# Patient Record
Sex: FEMALE
[De-identification: names, ages, dates, MRNs, and addresses within clinical notes are randomized per-mention and may not be internally consistent; named-entity substitution may affect disease eponyms.]

---

## 2017-05-11 ENCOUNTER — APPOINTMENT (OUTPATIENT)
Dept: DERMATOLOGY | Facility: CLINIC | Age: 37
End: 2017-05-11

## 2017-05-11 VITALS
BODY MASS INDEX: 18.04 KG/M2 | SYSTOLIC BLOOD PRESSURE: 110 MMHG | DIASTOLIC BLOOD PRESSURE: 76 MMHG | HEIGHT: 68 IN | WEIGHT: 119 LBS

## 2017-05-11 DIAGNOSIS — L82.1 OTHER SEBORRHEIC KERATOSIS: ICD-10-CM

## 2017-05-11 DIAGNOSIS — L70.9 ACNE, UNSPECIFIED: ICD-10-CM

## 2017-08-02 ENCOUNTER — APPOINTMENT (OUTPATIENT)
Dept: ORTHOPEDIC SURGERY | Facility: CLINIC | Age: 37
End: 2017-08-02

## 2017-08-03 ENCOUNTER — APPOINTMENT (OUTPATIENT)
Dept: DERMATOLOGY | Facility: CLINIC | Age: 37
End: 2017-08-03

## 2017-11-06 ENCOUNTER — APPOINTMENT (OUTPATIENT)
Dept: PULMONOLOGY | Facility: CLINIC | Age: 37
End: 2017-11-06

## 2017-11-06 RX ORDER — PREDNISONE 20 MG/1
20 TABLET ORAL
Qty: 8 | Refills: 0 | Status: DISCONTINUED | COMMUNITY
Start: 2017-10-12 | End: 2017-11-06

## 2017-11-06 RX ORDER — DOXYCYCLINE HYCLATE 100 MG/1
100 TABLET ORAL
Qty: 14 | Refills: 0 | Status: DISCONTINUED | COMMUNITY
Start: 2017-10-12 | End: 2017-11-06

## 2017-11-26 ENCOUNTER — TRANSCRIPTION ENCOUNTER (OUTPATIENT)
Age: 37
End: 2017-11-26

## 2018-01-26 ENCOUNTER — TRANSCRIPTION ENCOUNTER (OUTPATIENT)
Age: 38
End: 2018-01-26

## 2018-02-05 ENCOUNTER — TRANSCRIPTION ENCOUNTER (OUTPATIENT)
Age: 38
End: 2018-02-05

## 2018-02-14 ENCOUNTER — APPOINTMENT (OUTPATIENT)
Dept: PULMONOLOGY | Facility: CLINIC | Age: 38
End: 2018-02-14

## 2018-03-20 ENCOUNTER — APPOINTMENT (OUTPATIENT)
Dept: PULMONOLOGY | Facility: CLINIC | Age: 38
End: 2018-03-20

## 2018-03-20 RX ORDER — DOXYCYCLINE 100 MG/1
100 CAPSULE ORAL
Qty: 20 | Refills: 0 | Status: DISCONTINUED | COMMUNITY
Start: 2018-02-04

## 2018-03-20 RX ORDER — AMOXICILLIN AND CLAVULANATE POTASSIUM 875; 125 MG/1; MG/1
875-125 TABLET, COATED ORAL
Qty: 20 | Refills: 0 | Status: DISCONTINUED | COMMUNITY
Start: 2018-01-22

## 2018-03-20 RX ORDER — BENZONATATE 100 MG/1
100 CAPSULE ORAL
Qty: 21 | Refills: 0 | Status: DISCONTINUED | COMMUNITY
Start: 2018-01-26

## 2018-03-20 RX ORDER — ECONAZOLE NITRATE 10 MG/G
1 CREAM TOPICAL
Qty: 85 | Refills: 0 | Status: DISCONTINUED | COMMUNITY
Start: 2017-11-15

## 2018-03-22 ENCOUNTER — APPOINTMENT (OUTPATIENT)
Dept: DERMATOLOGY | Facility: CLINIC | Age: 38
End: 2018-03-22

## 2018-05-31 ENCOUNTER — APPOINTMENT (OUTPATIENT)
Dept: DERMATOLOGY | Facility: CLINIC | Age: 38
End: 2018-05-31

## 2018-10-03 ENCOUNTER — TRANSCRIPTION ENCOUNTER (OUTPATIENT)
Age: 38
End: 2018-10-03

## 2018-11-16 ENCOUNTER — APPOINTMENT (OUTPATIENT)
Dept: DERMATOLOGY | Facility: CLINIC | Age: 38
End: 2018-11-16

## 2019-01-15 ENCOUNTER — TRANSCRIPTION ENCOUNTER (OUTPATIENT)
Age: 39
End: 2019-01-15

## 2019-03-25 ENCOUNTER — TRANSCRIPTION ENCOUNTER (OUTPATIENT)
Age: 39
End: 2019-03-25

## 2019-04-26 ENCOUNTER — TRANSCRIPTION ENCOUNTER (OUTPATIENT)
Age: 39
End: 2019-04-26

## 2019-05-26 ENCOUNTER — TRANSCRIPTION ENCOUNTER (OUTPATIENT)
Age: 39
End: 2019-05-26

## 2019-05-28 ENCOUNTER — TRANSCRIPTION ENCOUNTER (OUTPATIENT)
Age: 39
End: 2019-05-28

## 2019-05-28 ENCOUNTER — APPOINTMENT (OUTPATIENT)
Dept: FAMILY MEDICINE | Facility: CLINIC | Age: 39
End: 2019-05-28
Payer: MEDICAID

## 2019-05-28 VITALS
RESPIRATION RATE: 16 BRPM | WEIGHT: 128 LBS | OXYGEN SATURATION: 99 % | DIASTOLIC BLOOD PRESSURE: 78 MMHG | HEART RATE: 74 BPM | TEMPERATURE: 97.3 F | HEIGHT: 68 IN | SYSTOLIC BLOOD PRESSURE: 124 MMHG | BODY MASS INDEX: 19.4 KG/M2

## 2019-05-28 DIAGNOSIS — Z80.0 FAMILY HISTORY OF MALIGNANT NEOPLASM OF DIGESTIVE ORGANS: ICD-10-CM

## 2019-05-28 DIAGNOSIS — N84.1 POLYP OF CERVIX UTERI: ICD-10-CM

## 2019-05-28 DIAGNOSIS — Z76.89 PERSONS ENCOUNTERING HEALTH SERVICES IN OTHER SPECIFIED CIRCUMSTANCES: ICD-10-CM

## 2019-05-28 DIAGNOSIS — I34.1 NONRHEUMATIC MITRAL (VALVE) PROLAPSE: ICD-10-CM

## 2019-05-28 DIAGNOSIS — Z12.83 ENCOUNTER FOR SCREENING FOR MALIGNANT NEOPLASM OF SKIN: ICD-10-CM

## 2019-05-28 DIAGNOSIS — N20.0 CALCULUS OF KIDNEY: ICD-10-CM

## 2019-05-28 LAB — CYTOLOGY CVX/VAG DOC THIN PREP: NORMAL

## 2019-05-28 PROCEDURE — 99204 OFFICE O/P NEW MOD 45 MIN: CPT | Mod: 25

## 2019-05-28 PROCEDURE — G0444 DEPRESSION SCREEN ANNUAL: CPT

## 2019-05-28 PROCEDURE — 36415 COLL VENOUS BLD VENIPUNCTURE: CPT

## 2019-05-28 RX ORDER — FLUTICASONE PROPIONATE 50 UG/1
50 SPRAY, METERED NASAL
Qty: 16 | Refills: 0 | Status: DISCONTINUED | COMMUNITY
Start: 2016-10-14 | End: 2019-05-28

## 2019-05-28 RX ORDER — NORGESTIMATE AND ETHINYL ESTRADIOL 7DAYSX3 LO
0.18/0.215/0.25 KIT ORAL
Qty: 28 | Refills: 0 | Status: DISCONTINUED | COMMUNITY
Start: 2017-03-10 | End: 2019-05-28

## 2019-05-28 RX ORDER — MELOXICAM 15 MG/1
15 TABLET ORAL
Qty: 30 | Refills: 0 | Status: DISCONTINUED | COMMUNITY
Start: 2017-07-18 | End: 2019-05-28

## 2019-05-28 RX ORDER — NORETHINDRONE ACETATE/ETHINYL ESTRADIOL AND FERROUS FUMARATE 1MG-20(21)
1-20 KIT ORAL
Qty: 28 | Refills: 0 | Status: DISCONTINUED | COMMUNITY
Start: 2017-09-28 | End: 2019-05-28

## 2019-05-28 RX ORDER — GUAIFENESIN AND PSEUDOEPHEDRINE HYDROCHLORIDE 600; 60 MG/1; MG/1
60-600 TABLET, EXTENDED RELEASE ORAL
Qty: 14 | Refills: 0 | Status: DISCONTINUED | COMMUNITY
Start: 2018-02-04 | End: 2019-05-28

## 2019-05-28 RX ORDER — MONTELUKAST SODIUM 10 MG/1
10 TABLET, FILM COATED ORAL
Refills: 0 | Status: COMPLETED | COMMUNITY
End: 2019-05-28

## 2019-05-28 NOTE — ASSESSMENT
[FreeTextEntry1] : Establish care\par - check labs today\par - up to date with pap, has follow up appt next month\par - will discuss vaccines at next visit\par - depression screening negative - PHQ score 0\par \par Joint pains\par - non specific joints\par - check labs, autoimmune labs\par \par Reflux/ Barretts / Hiatal hernia\par - recently diagnosed with Barretts on endoscopy\par - has seen multiple GI doctors for opinions\par - currently she just had a barium swallow and will be going for manometry\par - she is on omeprazole and zantac \par - she believes she is having negative side effects from omeprazole\par - advised she discuss with her GI and consider switching PPI to either pantoprazole or lansoprazole\par \par Skin cancer screening\par - due for dermatology check\par - referral given

## 2019-05-28 NOTE — PHYSICAL EXAM
[Well Nourished] : well nourished [Well Developed] : well developed [No Acute Distress] : no acute distress [Normal Sclera/Conjunctiva] : normal sclera/conjunctiva [Well-Appearing] : well-appearing [PERRL] : pupils equal round and reactive to light [EOMI] : extraocular movements intact [Normal Outer Ear/Nose] : the outer ears and nose were normal in appearance [Normal Oropharynx] : the oropharynx was normal [Normal TMs] : both tympanic membranes were normal [Supple] : supple [No Lymphadenopathy] : no lymphadenopathy [Thyroid Normal, No Nodules] : the thyroid was normal and there were no nodules present [Clear to Auscultation] : lungs were clear to auscultation bilaterally [No Respiratory Distress] : no respiratory distress  [Regular Rhythm] : with a regular rhythm [Normal Rate] : normal rate  [No Accessory Muscle Use] : no accessory muscle use [No Murmur] : no murmur heard [Normal S1, S2] : normal S1 and S2 [Pedal Pulses Present] : the pedal pulses are present [No Edema] : there was no peripheral edema [Soft] : abdomen soft [No Masses] : no abdominal mass palpated [Non-distended] : non-distended [Normal Posterior Cervical Nodes] : no posterior cervical lymphadenopathy [Normal Bowel Sounds] : normal bowel sounds [No HSM] : no HSM [Normal Anterior Cervical Nodes] : no anterior cervical lymphadenopathy [No CVA Tenderness] : no CVA  tenderness [No Spinal Tenderness] : no spinal tenderness [Grossly Normal Strength/Tone] : grossly normal strength/tone [No Joint Swelling] : no joint swelling [No Rash] : no rash [Normal Gait] : normal gait [No Focal Deficits] : no focal deficits [Alert and Oriented x3] : oriented to person, place, and time [Normal Insight/Judgement] : insight and judgment were intact [Normal Affect] : the affect was normal [de-identified] : mild epigastric tenderness to palpation

## 2019-05-28 NOTE — REVIEW OF SYSTEMS
[Heartburn] : heartburn [Joint Pain] : joint pain [Muscle Pain] : muscle pain [Joint Stiffness] : joint stiffness [Negative] : Psychiatric [Abdominal Pain] : no abdominal pain [Vomiting] : no vomiting [de-identified] : lightheaded

## 2019-05-28 NOTE — HISTORY OF PRESENT ILLNESS
[FreeTextEntry1] : establish care [de-identified] : Patient has multiple medical concerns. \par She was diagnosed with mitral valve prolapse at age 26. \par She had a miscarriage in 2007, 2011.  She has two children. \par Nasal polyps were removed in 2007, 2017.  She follows with Dr. Perlman for ENT.  \par Last year she had tightness in her chest/ shortness of breath.  She had pulmonary function tests done showing that she has asthma.  She uses Breo for this.  \par She gets blood work done every three months for elevated cholesterol.  She does not take medication for this at this time, diet controlled. \par Last blood work was in March. \par January 2019, she was not feeling well, feeling lightheaded.  She was having a pain in her upper left chest and back.  She went to urgent care at that time and her EKG was abnormal so she went to the hospital at [facility].  She saw a cardiologist and had testing done.  There were no blockages.  \par She saw a gastrointestinal doctor, Dr. Yee.  She had an endoscopy done and was told she has Barretts espophagus, started on omeprazole.  She followed with another GI, Dr. Patel, who wants her to have endoscopy done yearly.  She has not been feeling well since taking the omeprazole. She was not happy with this gastro, and now is seeing Dr. Anderson.  She had a barium swallow done with this GI doctor.  She is now planning to have manometry done.  He recommended to take omeprazole in morning and evening.  SHe was not tolerating twice daily so she is now only taking it once per day.\par She has also been having multiple joint pains which seems to be increasing since starting PPI.  She has been having joint pain for about one year.  It will last for a few days at a time, and then goes away on its own.  She notices her hands often feel hot/ burning sensation.  It happens more often at night but can happen during the day.  She also gets red and itching in her face when she gets the joint pain.  She also gets a tremor in her hands which has been going on for many years, as long as she can remember. \par

## 2019-05-28 NOTE — COUNSELING
[Healthy eating counseling provided] : healthy eating [Activity counseling provided] : activity [ - Annual Depression Screening] : Annual Depression Screening [Good understanding] : Patient has a good understanding of disease, goals and obesity follow-up plan [de-identified] : diet - healthy\par exercise - nothing currently

## 2019-05-28 NOTE — HEALTH RISK ASSESSMENT
[Good] : ~his/her~  mood as  good [No falls in past year] : Patient reported no falls in the past year [0] : 2) Feeling down, depressed, or hopeless: Not at all (0) [Patient reported PAP Smear was normal] : Patient reported PAP Smear was normal [HIV Test offered] : HIV Test offered [None] : None [With Family] : lives with family [Employed] : employed [Significant Other] : lives with significant other [# Of Children ___] : has [unfilled] children [Sexually Active] : sexually active [Fully functional (bathing, dressing, toileting, transferring, walking, feeding)] : Fully functional (bathing, dressing, toileting, transferring, walking, feeding) [Fully functional (using the telephone, shopping, preparing meals, housekeeping, doing laundry, using] : Fully functional and needs no help or supervision to perform IADLs (using the telephone, shopping, preparing meals, housekeeping, doing laundry, using transportation, managing medications and managing finances) [Smoke Detector] : smoke detector [Carbon Monoxide Detector] : carbon monoxide detector [Seat Belt] :  uses seat belt [Name: ___] : Health Care Proxy's Name: [unfilled]  [With Patient/Caregiver] : With Patient/Caregiver [Relationship: ___] : Relationship: [unfilled] [] : No [[id number]] : 0 [Change in mental status noted] : No change in mental status noted [Language] : denies difficulty with language [Reasoning] : denies difficulty with reasoning [Behavior] : denies difficulty with behavior [Reports changes in vision] : Reports no changes in vision [High Risk Behavior] : no high risk behavior [Reports changes in hearing] : Reports no changes in hearing [Reports changes in dental health] : Reports no changes in dental health [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

## 2019-05-31 LAB
25(OH)D3 SERPL-MCNC: 14.7 NG/ML
ALBUMIN SERPL ELPH-MCNC: 4.5 G/DL
ALP BLD-CCNC: 74 U/L
ALT SERPL-CCNC: 16 U/L
ANA SER IF-ACNC: NEGATIVE
ANION GAP SERPL CALC-SCNC: 16 MMOL/L
AST SERPL-CCNC: 18 U/L
BASOPHILS # BLD AUTO: 0.08 K/UL
BASOPHILS NFR BLD AUTO: 1.3 %
BILIRUB SERPL-MCNC: 0.3 MG/DL
BUN SERPL-MCNC: 10 MG/DL
CALCIUM SERPL-MCNC: 9.7 MG/DL
CCP AB SER IA-ACNC: <8 UNITS
CHLORIDE SERPL-SCNC: 101 MMOL/L
CHOLEST SERPL-MCNC: 172 MG/DL
CHOLEST/HDLC SERPL: 4 RATIO
CO2 SERPL-SCNC: 25 MMOL/L
CREAT SERPL-MCNC: 0.93 MG/DL
DSDNA AB SER-ACNC: <12 IU/ML
ENA SCL70 IGG SER IA-ACNC: <0.2 AL
ENA SS-A AB SER IA-ACNC: <0.2 AL
ENA SS-B AB SER IA-ACNC: <0.2 AL
EOSINOPHIL # BLD AUTO: 0.42 K/UL
EOSINOPHIL NFR BLD AUTO: 7 %
ESTIMATED AVERAGE GLUCOSE: 111 MG/DL
FOLATE SERPL-MCNC: 16.6 NG/ML
GLUCOSE SERPL-MCNC: 68 MG/DL
HBA1C MFR BLD HPLC: 5.5 %
HCT VFR BLD CALC: 45.1 %
HCV AB SER QL: NONREACTIVE
HCV S/CO RATIO: 0.16 S/CO
HDLC SERPL-MCNC: 43 MG/DL
HGB BLD-MCNC: 14.3 G/DL
HIV1+2 AB SPEC QL IA.RAPID: NONREACTIVE
IMM GRANULOCYTES NFR BLD AUTO: 0.2 %
LDLC SERPL CALC-MCNC: 105 MG/DL
LYMPHOCYTES # BLD AUTO: 1.79 K/UL
LYMPHOCYTES NFR BLD AUTO: 29.6 %
MAN DIFF?: NORMAL
MCHC RBC-ENTMCNC: 31 PG
MCHC RBC-ENTMCNC: 31.7 GM/DL
MCV RBC AUTO: 97.6 FL
MONOCYTES # BLD AUTO: 0.6 K/UL
MONOCYTES NFR BLD AUTO: 9.9 %
NEUTROPHILS # BLD AUTO: 3.14 K/UL
NEUTROPHILS NFR BLD AUTO: 52 %
PLATELET # BLD AUTO: 330 K/UL
POTASSIUM SERPL-SCNC: 4.1 MMOL/L
PROT SERPL-MCNC: 7.3 G/DL
RBC # BLD: 4.62 M/UL
RBC # FLD: 14.3 %
RF+CCP IGG SER-IMP: NEGATIVE
RHEUMATOID FACT SER QL: <10 IU/ML
SODIUM SERPL-SCNC: 142 MMOL/L
T4 FREE SERPL-MCNC: 1.3 NG/DL
TRIGL SERPL-MCNC: 118 MG/DL
TSH SERPL-ACNC: 2.37 UIU/ML
VIT B12 SERPL-MCNC: 332 PG/ML
WBC # FLD AUTO: 6.04 K/UL

## 2019-06-19 ENCOUNTER — APPOINTMENT (OUTPATIENT)
Dept: DERMATOLOGY | Facility: CLINIC | Age: 39
End: 2019-06-19

## 2019-07-02 ENCOUNTER — APPOINTMENT (OUTPATIENT)
Dept: DERMATOLOGY | Facility: CLINIC | Age: 39
End: 2019-07-02
Payer: MEDICAID

## 2019-07-02 VITALS — BODY MASS INDEX: 18.94 KG/M2 | HEIGHT: 68 IN | WEIGHT: 125 LBS

## 2019-07-02 VITALS — BODY MASS INDEX: 18.94 KG/M2 | WEIGHT: 125 LBS | HEIGHT: 68 IN

## 2019-07-02 DIAGNOSIS — L82.1 OTHER SEBORRHEIC KERATOSIS: ICD-10-CM

## 2019-07-02 DIAGNOSIS — L50.3 DERMATOGRAPHIC URTICARIA: ICD-10-CM

## 2019-07-02 PROCEDURE — 99213 OFFICE O/P EST LOW 20 MIN: CPT

## 2019-07-02 NOTE — CONSULT LETTER
[Dear  ___] : Dear  [unfilled], [Consult Letter:] : I had the pleasure of evaluating your patient, [unfilled]. [Consult Closing:] : Thank you very much for allowing me to participate in the care of this patient.  If you have any questions, please do not hesitate to contact me. [Sincerely,] : Sincerely, [FreeTextEntry2] : Emily Burgos, DO [FreeTextEntry1] : Examination of her skin reveals multiple small seborrheic keratoses on the trunk and no suspicious lesions.\par \par Patient also has scattered itching which may be due to dermatographism.\par \par Please see attached chart note for further details and treatment plan. [FreeTextEntry3] : Nick Sam MD\par [address], [address]\par [place] [address]\par Tel ([phone])\par Fax ([phone])\par Private line ([phone])\par

## 2019-07-02 NOTE — PHYSICAL EXAM
[Alert] : alert [Oriented x 3] : ~L oriented x 3 [Well Nourished] : well nourished [FreeTextEntry3] : The following areas were examined and no significant abnormalities were seen except as noted below:\par \par Type II skin\par \par scalp, face, eyelids, nose, lips, ears, neck, chest, abdomen, back, buttocks, right arm, left arm, right hand, left hand,\par right  leg, left leg, right foot, left foot\par Breast and groin exams offered and declined by patient.\par \par Face: Mild ill-defined scaling present in the glabella and cheeks\par Moderate comedones present on cheeks\par Left lower neck:2 2-3mm brown verrucous papules\par Trunk: Multiple small few large brown verrucous papule/plaques\par Popliteal fossa: No rash seen\par Mild follicular scaling noted\par \par No suspicious lesions seen\par \par stroke test:\par \par 2+ flare\par 2+ wheal\par ? pruritus

## 2019-07-02 NOTE — HISTORY OF PRESENT ILLNESS
[FreeTextEntry1] : Itching of face [de-identified] : Followup visit for 39-year-old referred by Eimly Burgos DO with a two-month history of marked itching of the face. Patient also complains of itching on the left lower neck area around 2 moles.  Patient also complains of itching behind the knees. No previous treatment. No previous episodes.\par Patient also presents for evaluation of growths. No history of skin cancer.

## 2019-07-02 NOTE — ASSESSMENT
[FreeTextEntry1] : ? Dermatographism as the cause of her itching\par Multiple seborrheic keratoses present on trunk

## 2019-07-15 ENCOUNTER — APPOINTMENT (OUTPATIENT)
Dept: DERMATOLOGY | Facility: CLINIC | Age: 39
End: 2019-07-15

## 2019-07-22 ENCOUNTER — APPOINTMENT (OUTPATIENT)
Dept: FAMILY MEDICINE | Facility: CLINIC | Age: 39
End: 2019-07-22
Payer: MEDICAID

## 2019-07-22 VITALS
RESPIRATION RATE: 14 BRPM | WEIGHT: 126 LBS | HEIGHT: 68 IN | HEART RATE: 63 BPM | DIASTOLIC BLOOD PRESSURE: 70 MMHG | SYSTOLIC BLOOD PRESSURE: 100 MMHG | OXYGEN SATURATION: 99 % | BODY MASS INDEX: 19.1 KG/M2

## 2019-07-22 PROCEDURE — 99214 OFFICE O/P EST MOD 30 MIN: CPT

## 2019-07-22 RX ORDER — RANITIDINE HYDROCHLORIDE 150 MG/1
150 TABLET, FILM COATED ORAL
Refills: 0 | Status: COMPLETED | COMMUNITY
End: 2019-07-22

## 2019-07-22 NOTE — PHYSICAL EXAM
[No Acute Distress] : no acute distress [Well Nourished] : well nourished [Well Developed] : well developed [Well-Appearing] : well-appearing [Normal Sclera/Conjunctiva] : normal sclera/conjunctiva [PERRL] : pupils equal round and reactive to light [EOMI] : extraocular movements intact [Normal Outer Ear/Nose] : the outer ears and nose were normal in appearance [Normal Oropharynx] : the oropharynx was normal [Normal TMs] : both tympanic membranes were normal [No Lymphadenopathy] : no lymphadenopathy [Supple] : supple [No Respiratory Distress] : no respiratory distress  [No Accessory Muscle Use] : no accessory muscle use [Clear to Auscultation] : lungs were clear to auscultation bilaterally [Normal Rate] : normal rate  [Regular Rhythm] : with a regular rhythm [Normal S1, S2] : normal S1 and S2 [No Edema] : there was no peripheral edema [Soft] : abdomen soft [Non Tender] : non-tender [Non-distended] : non-distended [Normal Bowel Sounds] : normal bowel sounds [Normal Posterior Cervical Nodes] : no posterior cervical lymphadenopathy [Normal Anterior Cervical Nodes] : no anterior cervical lymphadenopathy [No Spinal Tenderness] : no spinal tenderness [Grossly Normal Strength/Tone] : grossly normal strength/tone [No Rash] : no rash [Coordination Grossly Intact] : coordination grossly intact [No Focal Deficits] : no focal deficits [Normal Gait] : normal gait [Normal Affect] : the affect was normal [Alert and Oriented x3] : oriented to person, place, and time [Normal Insight/Judgement] : insight and judgment were intact

## 2019-07-22 NOTE — ASSESSMENT
[FreeTextEntry1] : Dizziness\par - persistent since January\par - had cardio work up in ED at the time and was negative\par - follows with cardiology and does yearly holter, last year negative\par - no blood work abnormalities thus far\par - has an appointment with neurology this week - referral given \par \par Barretts \par - following with GI regularly\par - still on omeprazole and ranitidine\par - asymptomatic\par - awaiting results of CT abdomen with contrast\par - going for manometry testing in August\par \par Joint pains\par - saw rheumatology\par - awaiting results of recent blood work done

## 2019-07-22 NOTE — HISTORY OF PRESENT ILLNESS
[FreeTextEntry1] : follow up  [de-identified] : Patient was seen by GI 2 days ago and she had a CT scan with contrast. \par In 2 weeks she is going back for manometry.\par She has an appointment with ENT tomorrow because she had a dry cough, dizziness, ears clogged. \par Her dizziness it worst when she is laying down.  Occasionally she gets dizzy when she does certain movements with her head but mostly it occurs when she is laying still.  She has a horrible taste in her mouth all day which she can't get rid of. \par She was at rheumatology last week, Dr. Yuan with [facility].  She has a follow up on 7/29.  She did blood work that she is waiting on results.  \par She is seeing Dr. Shoemaker on Wednesday, neurology to discuss dizziness. \par The dizziness has persisted for months.  It happens intermittently.  Mostly at night.  Lasts typically for a few hours at a time.  She feels the room spinning sensation.

## 2019-07-22 NOTE — REVIEW OF SYSTEMS
[Negative] : Integumentary [Joint Pain] : joint pain [Dizziness] : dizziness [Abdominal Pain] : no abdominal pain [Vomiting] : no vomiting [Heartburn] : no heartburn [FreeTextEntry4] : ears clogged

## 2019-07-25 ENCOUNTER — APPOINTMENT (OUTPATIENT)
Dept: NEUROLOGY | Facility: CLINIC | Age: 39
End: 2019-07-25
Payer: MEDICAID

## 2019-07-25 VITALS
HEART RATE: 64 BPM | HEIGHT: 68 IN | BODY MASS INDEX: 18.94 KG/M2 | WEIGHT: 125 LBS | SYSTOLIC BLOOD PRESSURE: 114 MMHG | DIASTOLIC BLOOD PRESSURE: 77 MMHG

## 2019-07-25 DIAGNOSIS — Z86.39 PERSONAL HISTORY OF OTHER ENDOCRINE, NUTRITIONAL AND METABOLIC DISEASE: ICD-10-CM

## 2019-07-25 DIAGNOSIS — Z83.3 FAMILY HISTORY OF DIABETES MELLITUS: ICD-10-CM

## 2019-07-25 DIAGNOSIS — H81.10 BENIGN PAROXYSMAL VERTIGO, UNSPECIFIED EAR: ICD-10-CM

## 2019-07-25 DIAGNOSIS — Z80.9 FAMILY HISTORY OF MALIGNANT NEOPLASM, UNSPECIFIED: ICD-10-CM

## 2019-07-25 PROCEDURE — 99204 OFFICE O/P NEW MOD 45 MIN: CPT

## 2019-07-25 NOTE — PHYSICAL EXAM
[General Appearance - Alert] : alert [General Appearance - In No Acute Distress] : in no acute distress [Oriented To Time, Place, And Person] : oriented to person, place, and time [Impaired Insight] : insight and judgment were intact [Affect] : the affect was normal [Person] : oriented to person [Place] : oriented to place [Time] : oriented to time [Concentration Intact] : normal concentrating ability [Visual Intact] : visual attention was ~T not ~L decreased [Naming Objects] : no difficulty naming common objects [Repeating Phrases] : no difficulty repeating a phrase [Writing A Sentence] : no difficulty writing a sentence [Fluency] : fluency intact [Comprehension] : comprehension intact [Reading] : reading intact [Past History] : adequate knowledge of personal past history [Cranial Nerves Optic (II)] : visual acuity intact bilaterally,  visual fields full to confrontation, pupils equal round and reactive to light [Cranial Nerves Oculomotor (III)] : extraocular motion intact [Cranial Nerves Trigeminal (V)] : facial sensation intact symmetrically [Cranial Nerves Facial (VII)] : face symmetrical [Cranial Nerves Vestibulocochlear (VIII)] : hearing was intact bilaterally [Cranial Nerves Glossopharyngeal (IX)] : tongue and palate midline [Cranial Nerves Accessory (XI - Cranial And Spinal)] : head turning and shoulder shrug symmetric [Cranial Nerves Hypoglossal (XII)] : there was no tongue deviation with protrusion [Motor Strength] : muscle strength was normal in all four extremities [No Muscle Atrophy] : normal bulk in all four extremities [Sensation Tactile Decrease] : light touch was intact [Balance] : balance was intact [Past-pointing] : there was no past-pointing [Tremor] : a tremor present [2+] : Ankle jerk left 2+ [Plantar Reflex Right Only] : normal on the right [Plantar Reflex Left Only] : normal on the left [FreeTextEntry7] : Decreased sensation Rt hand median destribution. Tinel's and phalen's sign negative [FreeTextEntry8] : Bilat intention tremors [Sclera] : the sclera and conjunctiva were normal [PERRL With Normal Accommodation] : pupils were equal in size, round, reactive to light, with normal accommodation [Extraocular Movements] : extraocular movements were intact [Outer Ear] : the ears and nose were normal in appearance [Oropharynx] : the oropharynx was normal [Neck Appearance] : the appearance of the neck was normal [Neck Cervical Mass (___cm)] : no neck mass was observed [Jugular Venous Distention Increased] : there was no jugular-venous distention [Thyroid Diffuse Enlargement] : the thyroid was not enlarged [Thyroid Nodule] : there were no palpable thyroid nodules [Auscultation Breath Sounds / Voice Sounds] : lungs were clear to auscultation bilaterally [Heart Rate And Rhythm] : heart rate was normal and rhythm regular [Heart Sounds] : normal S1 and S2 [Heart Sounds Gallop] : no gallops [Murmurs] : no murmurs [Heart Sounds Pericardial Friction Rub] : no pericardial rub [Full Pulse] : the pedal pulses are present [Edema] : there was no peripheral edema [Bowel Sounds] : normal bowel sounds [Abdomen Soft] : soft [Abdomen Tenderness] : non-tender [Abdomen Mass (___ Cm)] : no abdominal mass palpated [No CVA Tenderness] : no ~M costovertebral angle tenderness [No Spinal Tenderness] : no spinal tenderness [Abnormal Walk] : normal gait [Nail Clubbing] : no clubbing  or cyanosis of the fingernails [Musculoskeletal - Swelling] : no joint swelling seen [Motor Tone] : muscle strength and tone were normal [Skin Color & Pigmentation] : normal skin color and pigmentation [Skin Turgor] : normal skin turgor [] : no rash

## 2019-07-25 NOTE — REVIEW OF SYSTEMS
[Numbness] : numbness [Tingling] : tingling [Dizziness] : dizziness [Lightheadedness] : lightheadedness [Vertigo] : vertigo [Loss Of Hearing] : hearing loss [Heart Rate Is Fast] : fast heart rate [Heartburn] : heartburn [Arthralgias] : arthralgias [Joint Pain] : joint pain [Negative] : Heme/Lymph

## 2019-07-25 NOTE — HISTORY OF PRESENT ILLNESS
[FreeTextEntry1] : She is 39-year-old patient coming here for evaluation for episodes of dizziness feeling of lightheadedness as if she is going to pain since January on and off seen and evaluated by several physicians since then diagnosed to have gastric reflux with hiatal hernia and Thornton esophagus. Seen by cardiology thought to have SVT then diagnosed to have mitral prolapse. Now complaining about intermittent dizziness in bed while turning from side to side but last couple of months since started on omeprazole for  acid reflux.\par \par Seen by ENT evaluated and scheduled for ENG test next week. Also has benign essential tremors and multilevel joint pains for which she was seen by rheumatologist workup pending. Her headaches mild bilaterally with underlying tremors. Intermittent numbness involving the right hand pain in the joints. Not associated with any weakness. No neck pain or back pain.

## 2019-07-25 NOTE — DISCUSSION/SUMMARY
[FreeTextEntry1] : Patient with recurrent episodes of lightheadedness with mild dizziness intermittently with change of position rule out benign positional vertigo rule out CNS pathology.\par \par Benign essential tremors bilaterally without any family history.\par \par Also had mild right upper extremity carpal  tunnel syndrome. \par \par Arthralgias involving multiple joints rule out rheumatological condition.\par \par Recommend patient to have MRI of the brain with attention to CP angle.\par \par ENG test with ENT evaluation pending.\par \par EEG to rule out CNS pathology.\par \par If above workup is negative recommend cardiac evaluation with tilt table test for orthostatic hypotension.\par \par Recommend EMG/NCV study for right upper extremity for carpal tunnel syndrome. \par \par No medications recommended for tremors at this time.\par \par Patient education provided discussed with the patient at length.\par \par Followup evaluation after workup is completed.\par \par

## 2019-07-29 ENCOUNTER — APPOINTMENT (OUTPATIENT)
Dept: NEUROLOGY | Facility: CLINIC | Age: 39
End: 2019-07-29
Payer: MEDICAID

## 2019-08-12 ENCOUNTER — APPOINTMENT (OUTPATIENT)
Dept: NEUROLOGY | Facility: CLINIC | Age: 39
End: 2019-08-12
Payer: MEDICAID

## 2019-08-12 DIAGNOSIS — G25.0 ESSENTIAL TREMOR: ICD-10-CM

## 2019-08-12 PROCEDURE — 95816 EEG AWAKE AND DROWSY: CPT

## 2019-08-16 ENCOUNTER — APPOINTMENT (OUTPATIENT)
Dept: NEUROLOGY | Facility: CLINIC | Age: 39
End: 2019-08-16
Payer: MEDICAID

## 2019-08-16 DIAGNOSIS — R20.0 ANESTHESIA OF SKIN: ICD-10-CM

## 2019-08-16 DIAGNOSIS — R20.2 ANESTHESIA OF SKIN: ICD-10-CM

## 2019-08-16 PROCEDURE — 95909 NRV CNDJ TST 5-6 STUDIES: CPT

## 2019-08-16 PROCEDURE — 95885 MUSC TST DONE W/NERV TST LIM: CPT

## 2019-08-16 NOTE — CONSULT LETTER
[Dear  ___] : Dear  [unfilled], [Consult Letter:] : I had the pleasure of evaluating your patient, [unfilled]. [Please see my note below.] : Please see my note below. [Sincerely,] : Sincerely, [Consult Closing:] : Thank you very much for allowing me to participate in the care of this patient.  If you have any questions, please do not hesitate to contact me.

## 2019-08-16 NOTE — PHYSICAL EXAM
[General Appearance - Alert] : alert [Oriented To Time, Place, And Person] : oriented to person, place, and time [Impaired Insight] : insight and judgment were intact [General Appearance - In No Acute Distress] : in no acute distress [Affect] : the affect was normal [Person] : oriented to person [Place] : oriented to place [Time] : oriented to time [Concentration Intact] : normal concentrating ability [Naming Objects] : no difficulty naming common objects [Visual Intact] : visual attention was ~T not ~L decreased [Repeating Phrases] : no difficulty repeating a phrase [Writing A Sentence] : no difficulty writing a sentence [Fluency] : fluency intact [Comprehension] : comprehension intact [Reading] : reading intact [Past History] : adequate knowledge of personal past history [Cranial Nerves Optic (II)] : visual acuity intact bilaterally,  visual fields full to confrontation, pupils equal round and reactive to light [Cranial Nerves Oculomotor (III)] : extraocular motion intact [Cranial Nerves Facial (VII)] : face symmetrical [Cranial Nerves Trigeminal (V)] : facial sensation intact symmetrically [Cranial Nerves Glossopharyngeal (IX)] : tongue and palate midline [Cranial Nerves Vestibulocochlear (VIII)] : hearing was intact bilaterally [Cranial Nerves Accessory (XI - Cranial And Spinal)] : head turning and shoulder shrug symmetric [Cranial Nerves Hypoglossal (XII)] : there was no tongue deviation with protrusion [Motor Strength] : muscle strength was normal in all four extremities [Sensation Tactile Decrease] : light touch was intact [No Muscle Atrophy] : normal bulk in all four extremities [Balance] : balance was intact [Past-pointing] : there was no past-pointing [Tremor] : a tremor present [2+] : Ankle jerk left 2+ [Plantar Reflex Left Only] : normal on the left [Plantar Reflex Right Only] : normal on the right [FreeTextEntry7] : Decreased sensation Rt hand median destribution. Tinel's and phalen's sign negative [FreeTextEntry8] : Bilat intention tremors [Sclera] : the sclera and conjunctiva were normal [PERRL With Normal Accommodation] : pupils were equal in size, round, reactive to light, with normal accommodation [Extraocular Movements] : extraocular movements were intact [Outer Ear] : the ears and nose were normal in appearance [Neck Appearance] : the appearance of the neck was normal [Oropharynx] : the oropharynx was normal [Neck Cervical Mass (___cm)] : no neck mass was observed [Jugular Venous Distention Increased] : there was no jugular-venous distention [Thyroid Nodule] : there were no palpable thyroid nodules [Thyroid Diffuse Enlargement] : the thyroid was not enlarged [Auscultation Breath Sounds / Voice Sounds] : lungs were clear to auscultation bilaterally [Heart Rate And Rhythm] : heart rate was normal and rhythm regular [Heart Sounds] : normal S1 and S2 [Heart Sounds Gallop] : no gallops [Heart Sounds Pericardial Friction Rub] : no pericardial rub [Murmurs] : no murmurs [Full Pulse] : the pedal pulses are present [Edema] : there was no peripheral edema [Abdomen Soft] : soft [Bowel Sounds] : normal bowel sounds [Abdomen Mass (___ Cm)] : no abdominal mass palpated [Abdomen Tenderness] : non-tender [No CVA Tenderness] : no ~M costovertebral angle tenderness [No Spinal Tenderness] : no spinal tenderness [Nail Clubbing] : no clubbing  or cyanosis of the fingernails [Abnormal Walk] : normal gait [Musculoskeletal - Swelling] : no joint swelling seen [Motor Tone] : muscle strength and tone were normal [Skin Color & Pigmentation] : normal skin color and pigmentation [Skin Turgor] : normal skin turgor [] : no rash

## 2019-08-16 NOTE — REVIEW OF SYSTEMS
[Numbness] : numbness [Tingling] : tingling [Dizziness] : dizziness [Lightheadedness] : lightheadedness [Vertigo] : vertigo [Loss Of Hearing] : hearing loss [Heart Rate Is Fast] : fast heart rate [Arthralgias] : arthralgias [Heartburn] : heartburn [Joint Pain] : joint pain [Negative] : Heme/Lymph

## 2019-08-16 NOTE — DISCUSSION/SUMMARY
[FreeTextEntry1] : Patient dizziness improved significantly since last visit.\par \par EMG/NCV studies done today revealed findings suggestive of chronic C6-7 cervical radiculopathy. Patient has no symptoms of cervical pain and radicular symptoms.\par \par The evidence of carpal bowel syndrome or peripheral neuropathy.

## 2019-08-16 NOTE — PROCEDURE
[FreeTextEntry1] : EMG/NCV study of right upper extremity revealed findings indicative of chronic C6-7 radiculopathy.\par No evidence of carpal syndrome or peripheral neuropathy.\par \par

## 2019-08-16 NOTE — HISTORY OF PRESENT ILLNESS
[FreeTextEntry1] : She is 39-year-old patient coming here for evaluation for dizziness and lightheadedness with the right upper extremity paresthesias numbness.\par \par Patient evaluated with MRI scans and did not reveal any acute intracranial pathology.\par \par Coming here for EMG/NCV studies her right upper extremity and numbness involving right upper extremity with pain.

## 2019-08-16 NOTE — REASON FOR VISIT
[Procedure: _________] : a [unfilled] procedure visit [FreeTextEntry1] : Pt coming for EMG/NCV study of RT UE

## 2019-08-20 ENCOUNTER — APPOINTMENT (OUTPATIENT)
Dept: FAMILY MEDICINE | Facility: CLINIC | Age: 39
End: 2019-08-20

## 2019-08-22 ENCOUNTER — APPOINTMENT (OUTPATIENT)
Dept: NEUROLOGY | Facility: CLINIC | Age: 39
End: 2019-08-22
Payer: MEDICAID

## 2019-08-29 ENCOUNTER — APPOINTMENT (OUTPATIENT)
Dept: NEUROLOGY | Facility: CLINIC | Age: 39
End: 2019-08-29
Payer: MEDICAID

## 2019-08-29 DIAGNOSIS — R42 DIZZINESS AND GIDDINESS: ICD-10-CM

## 2019-08-29 PROCEDURE — 95953: CPT

## 2019-08-30 ENCOUNTER — APPOINTMENT (OUTPATIENT)
Dept: NEUROLOGY | Facility: CLINIC | Age: 39
End: 2019-08-30
Payer: MEDICAID

## 2019-08-30 DIAGNOSIS — G25.2 OTHER SPECIFIED FORMS OF TREMOR: ICD-10-CM

## 2019-08-30 DIAGNOSIS — R94.01 ABNORMAL ELECTROENCEPHALOGRAM [EEG]: ICD-10-CM

## 2019-08-30 DIAGNOSIS — R55 SYNCOPE AND COLLAPSE: ICD-10-CM

## 2019-08-30 PROCEDURE — 95953: CPT

## 2019-09-02 ENCOUNTER — RX RENEWAL (OUTPATIENT)
Age: 39
End: 2019-09-02

## 2019-09-05 PROBLEM — R42 DIZZINESS: Status: ACTIVE | Noted: 2019-07-22

## 2019-09-05 PROBLEM — R94.01 ABNORMAL EEG: Status: ACTIVE | Noted: 2019-08-12

## 2019-09-05 PROBLEM — R55 NEAR SYNCOPE: Status: ACTIVE | Noted: 2019-07-25

## 2019-09-05 PROBLEM — G25.2 COARSE TREMORS: Status: ACTIVE | Noted: 2019-07-25

## 2019-09-27 ENCOUNTER — RX RENEWAL (OUTPATIENT)
Age: 39
End: 2019-09-27

## 2019-10-11 ENCOUNTER — APPOINTMENT (OUTPATIENT)
Dept: FAMILY MEDICINE | Facility: CLINIC | Age: 39
End: 2019-10-11
Payer: MEDICAID

## 2019-10-11 VITALS
RESPIRATION RATE: 16 BRPM | WEIGHT: 129 LBS | HEIGHT: 68 IN | SYSTOLIC BLOOD PRESSURE: 112 MMHG | HEART RATE: 79 BPM | DIASTOLIC BLOOD PRESSURE: 79 MMHG | OXYGEN SATURATION: 99 % | BODY MASS INDEX: 19.55 KG/M2

## 2019-10-11 PROCEDURE — 99214 OFFICE O/P EST MOD 30 MIN: CPT

## 2019-10-11 RX ORDER — RANITIDINE HYDROCHLORIDE 150 MG/1
150 CAPSULE ORAL
Qty: 30 | Refills: 0 | Status: COMPLETED | COMMUNITY
Start: 2019-07-16 | End: 2019-10-11

## 2019-10-11 NOTE — ASSESSMENT
[FreeTextEntry1] : Mouth sores\par - ? viral etiology\par - outer lip appears as cold sore\par - recommend course of valtrex\par - also advised to speak with rheumatologist - in the setting of her other symptoms (dry eyes, joint pain, etc) may consider Behcets\par \par Barretts esophagus\par Gallbladder polyp\par - patient looking to establish with new GI - referral given\par - due for endoscopy\par - stopped ranitidine due to recall\par - symptoms stable

## 2019-10-11 NOTE — PHYSICAL EXAM
[Well Nourished] : well nourished [No Acute Distress] : no acute distress [Well Developed] : well developed [PERRL] : pupils equal round and reactive to light [Normal Sclera/Conjunctiva] : normal sclera/conjunctiva [Normal Outer Ear/Nose] : the outer ears and nose were normal in appearance [EOMI] : extraocular movements intact [Normal TMs] : both tympanic membranes were normal [No Lymphadenopathy] : no lymphadenopathy [Supple] : supple [No Accessory Muscle Use] : no accessory muscle use [No Respiratory Distress] : no respiratory distress  [Clear to Auscultation] : lungs were clear to auscultation bilaterally [Normal S1, S2] : normal S1 and S2 [Regular Rhythm] : with a regular rhythm [Normal Rate] : normal rate  [Soft] : abdomen soft [No Edema] : there was no peripheral edema [Non Tender] : non-tender [Non-distended] : non-distended [Normal Bowel Sounds] : normal bowel sounds [No Focal Deficits] : no focal deficits [Normal Posterior Cervical Nodes] : no posterior cervical lymphadenopathy [Normal Anterior Cervical Nodes] : no anterior cervical lymphadenopathy [Normal Gait] : normal gait [Normal Affect] : the affect was normal [Normal Insight/Judgement] : insight and judgment were intact [de-identified] : sores on roof of mouth, cold sore outer upper lip

## 2019-10-11 NOTE — HISTORY OF PRESENT ILLNESS
[FreeTextEntry1] : follow up  [de-identified] : Patient is here today for follow up. \par She has been following with GI and recently has studies showing her lower esophageal sphincter is too tight.  She also had an abdominal US which showed a gallbladder polyp.  She is getting frustrated with her GI doctor because she can never get through to him and he is not returning calls. \par Tuesday night she was having dry chapped lips.  She has sores throughout her mouth and lips.  She is not sure what to do about it. They are painful when she eats.  \par She is following with rheumatology and no new treatments started.

## 2019-10-18 ENCOUNTER — APPOINTMENT (OUTPATIENT)
Dept: OBGYN | Facility: CLINIC | Age: 39
End: 2019-10-18
Payer: MEDICAID

## 2019-10-18 VITALS
DIASTOLIC BLOOD PRESSURE: 80 MMHG | BODY MASS INDEX: 19.31 KG/M2 | SYSTOLIC BLOOD PRESSURE: 110 MMHG | TEMPERATURE: 98.5 F | HEIGHT: 68 IN | WEIGHT: 127.43 LBS

## 2019-10-18 DIAGNOSIS — Z12.31 ENCOUNTER FOR SCREENING MAMMOGRAM FOR MALIGNANT NEOPLASM OF BREAST: ICD-10-CM

## 2019-10-18 DIAGNOSIS — R10.2 PELVIC AND PERINEAL PAIN: ICD-10-CM

## 2019-10-18 DIAGNOSIS — Z01.419 ENCOUNTER FOR GYNECOLOGICAL EXAMINATION (GENERAL) (ROUTINE) W/OUT ABNORMAL FINDINGS: ICD-10-CM

## 2019-10-18 LAB
HCG UR QL: NEGATIVE
QUALITY CONTROL: YES

## 2019-10-18 PROCEDURE — 99386 PREV VISIT NEW AGE 40-64: CPT

## 2019-10-18 NOTE — REVIEW OF SYSTEMS
[SOB on Exertion] : shortness of breath during exertion [Frequency] : frequency [Abdominal Pain] : abdominal pain [Arthralgias] : arthralgias

## 2019-10-18 NOTE — PHYSICAL EXAM
[Awake] : awake [Alert] : alert [Soft] : soft [Oriented x3] : oriented to person, place, and time [Labia Majora] : labia major [Labia Minora] : labia minora [Normal] : clitoris [No Bleeding] : there was no active vaginal bleeding [Adnexa Tenderness On The Left] : was tender to palpation [Uterine Adnexae] : were not tender and not enlarged [Pap Obtained] : a Pap smear was performed [Acute Distress] : no acute distress [LAD] : no lymphadenopathy [Thyroid Nodule] : no thyroid nodule [Goiter] : no goiter [Tender] : no tenderness [Mass] : no breast mass [Axillary LAD] : no axillary lymphadenopathy [Distended] : not distended [Nipple Discharge] : no nipple discharge [Depressed Mood] : not depressed [H/Smegaly] : no hepatosplenomegaly [Flat Affect] : affect not flat [FreeTextEntry7] : slight discomfort

## 2019-10-18 NOTE — HISTORY OF PRESENT ILLNESS
[1 Year Ago] : 1 year ago [Healthy Diet] : a healthy diet [Good] : being in good health [Last Pap ___] : Last cervical pap smear was [unfilled] [Regular Exercise] : regular exercise

## 2019-10-21 LAB
C TRACH RRNA SPEC QL NAA+PROBE: NOT DETECTED
HPV HIGH+LOW RISK DNA PNL CVX: NOT DETECTED
N GONORRHOEA RRNA SPEC QL NAA+PROBE: NOT DETECTED
SOURCE TP AMPLIFICATION: NORMAL

## 2019-10-25 ENCOUNTER — APPOINTMENT (OUTPATIENT)
Dept: OBGYN | Facility: CLINIC | Age: 39
End: 2019-10-25

## 2019-10-30 ENCOUNTER — MEDICATION RENEWAL (OUTPATIENT)
Age: 39
End: 2019-10-30

## 2019-10-30 ENCOUNTER — OTHER (OUTPATIENT)
Age: 39
End: 2019-10-30

## 2019-10-30 RX ORDER — NORETHINDRONE ACETATE AND ETHINYL ESTRADIOL 1.5; 3 MG/1; UG/1
1.5-3 TABLET ORAL DAILY
Qty: 90 | Refills: 1 | Status: DISCONTINUED | COMMUNITY
Start: 1900-01-01 | End: 2019-10-30

## 2019-11-19 ENCOUNTER — APPOINTMENT (OUTPATIENT)
Dept: GASTROENTEROLOGY | Facility: CLINIC | Age: 39
End: 2019-11-19
Payer: MEDICAID

## 2019-11-19 VITALS
HEART RATE: 71 BPM | HEIGHT: 68 IN | WEIGHT: 125 LBS | DIASTOLIC BLOOD PRESSURE: 71 MMHG | BODY MASS INDEX: 18.94 KG/M2 | SYSTOLIC BLOOD PRESSURE: 120 MMHG

## 2019-11-19 DIAGNOSIS — K59.00 CONSTIPATION, UNSPECIFIED: ICD-10-CM

## 2019-11-19 DIAGNOSIS — R19.8 OTHER SPECIFIED SYMPTOMS AND SIGNS INVOLVING THE DIGESTIVE SYSTEM AND ABDOMEN: ICD-10-CM

## 2019-11-19 PROCEDURE — 99204 OFFICE O/P NEW MOD 45 MIN: CPT

## 2019-11-23 PROBLEM — R19.8 ALTERED BOWEL FUNCTION: Status: ACTIVE | Noted: 2019-11-19

## 2019-11-23 PROBLEM — K59.00 CONSTIPATION, UNSPECIFIED CONSTIPATION TYPE: Status: ACTIVE | Noted: 2019-11-19

## 2019-11-23 NOTE — REVIEW OF SYSTEMS
[Dry Eyes] : dryness of the eyes [Cough] : cough [Itching] : itching [Dizziness] : dizziness [Negative] : Heme/Lymph [Red Eyes] : eyes not red [Eyesight Problems] : no eyesight problems [Discharge From Eyes] : no purulent discharge from the eyes [Shortness Of Breath] : no shortness of breath [Wheezing] : no wheezing [Dysuria] : no dysuria [Incontinence] : no incontinence [Pelvic Pain] : no pelvic pain [Dysmenorrhea] : no dysmenorrhea [Confused] : no confusion [Fainting] : no fainting [Limb Weakness] : no limb weakness [Difficulty Walking] : no difficulty walking [FreeTextEntry8] : increased frequency [de-identified] : tremors at times

## 2019-11-23 NOTE — HISTORY OF PRESENT ILLNESS
[de-identified] : 38yo female for evaluation of GERD and lower abdominal pain\par She has had gerd for some time with recent increased mouth ulcers.  Also with left-sided pain without clear mitigating or inciting factors\par She has seen prior GI MD and had EGD several months ago and told of possible Thornton's esophagus.  her PMD concerned for?behcets disease\par she has left-sided abdominal pain that can wake her from sleep.\par she also notes that she has altered BMs with occasional loose stool, but also with difficulty passage at times.

## 2019-11-26 ENCOUNTER — TRANSCRIPTION ENCOUNTER (OUTPATIENT)
Age: 39
End: 2019-11-26

## 2019-11-27 ENCOUNTER — APPOINTMENT (OUTPATIENT)
Dept: GASTROENTEROLOGY | Facility: AMBULATORY MEDICAL SERVICES | Age: 39
End: 2019-11-27

## 2020-01-02 ENCOUNTER — RX RENEWAL (OUTPATIENT)
Age: 40
End: 2020-01-02

## 2020-01-03 ENCOUNTER — APPOINTMENT (OUTPATIENT)
Dept: GASTROENTEROLOGY | Facility: AMBULATORY MEDICAL SERVICES | Age: 40
End: 2020-01-03
Payer: MEDICAID

## 2020-01-03 ENCOUNTER — RESULT REVIEW (OUTPATIENT)
Age: 40
End: 2020-01-03

## 2020-01-03 PROCEDURE — 43239 EGD BIOPSY SINGLE/MULTIPLE: CPT

## 2020-01-03 PROCEDURE — 45378 DIAGNOSTIC COLONOSCOPY: CPT

## 2020-01-08 ENCOUNTER — APPOINTMENT (OUTPATIENT)
Dept: FAMILY MEDICINE | Facility: CLINIC | Age: 40
End: 2020-01-08
Payer: MEDICAID

## 2020-01-08 DIAGNOSIS — Z11.1 ENCOUNTER FOR SCREENING FOR RESPIRATORY TUBERCULOSIS: ICD-10-CM

## 2020-01-08 PROCEDURE — 86580 TB INTRADERMAL TEST: CPT

## 2020-01-10 ENCOUNTER — APPOINTMENT (OUTPATIENT)
Dept: FAMILY MEDICINE | Facility: CLINIC | Age: 40
End: 2020-01-10
Payer: MEDICAID

## 2020-01-10 VITALS
OXYGEN SATURATION: 98 % | TEMPERATURE: 98.5 F | WEIGHT: 125 LBS | HEIGHT: 68 IN | BODY MASS INDEX: 18.94 KG/M2 | SYSTOLIC BLOOD PRESSURE: 90 MMHG | DIASTOLIC BLOOD PRESSURE: 60 MMHG | RESPIRATION RATE: 16 BRPM | HEART RATE: 79 BPM

## 2020-01-10 DIAGNOSIS — Z11.1 ENCOUNTER FOR SCREENING FOR RESPIRATORY TUBERCULOSIS: ICD-10-CM

## 2020-01-10 PROCEDURE — 99213 OFFICE O/P EST LOW 20 MIN: CPT

## 2020-01-10 RX ORDER — SODIUM SULFATE, POTASSIUM SULFATE, MAGNESIUM SULFATE 17.5; 3.13; 1.6 G/ML; G/ML; G/ML
17.5-3.13-1.6 SOLUTION, CONCENTRATE ORAL
Qty: 1 | Refills: 0 | Status: COMPLETED | COMMUNITY
Start: 2019-11-19 | End: 2020-01-10

## 2020-01-10 NOTE — HISTORY OF PRESENT ILLNESS
[FreeTextEntry8] : Patient is here today for an acute visit.\par She has been having congestion for the past 10 days. \par She has nasal polyps which cause back up of mucus.\par She usually uses steroid to help decongest. \par She has tried over the counter meds for the past week that don't help.\par No fevers. \par Does admit to starting to have ear fullness and pain.\par \par PPD placed on 1/8/2020.\par Read today negative 0mm.

## 2020-01-10 NOTE — ASSESSMENT
[FreeTextEntry1] : Sinusitis\par Nasal polyp\par - start medrol\par - nasal spray as needed\par - if not improving with steroid call office, may need abx\par \par PPD read\par - placed 1/8/20\par - read today 1/10/20\par - 0mm, negative

## 2020-01-10 NOTE — PHYSICAL EXAM
[No Acute Distress] : no acute distress [Well Developed] : well developed [Well Nourished] : well nourished [Normal Sclera/Conjunctiva] : normal sclera/conjunctiva [PERRL] : pupils equal round and reactive to light [Normal Outer Ear/Nose] : the outer ears and nose were normal in appearance [Normal Oropharynx] : the oropharynx was normal [No Lymphadenopathy] : no lymphadenopathy [Normal TMs] : both tympanic membranes were normal [Supple] : supple [No Respiratory Distress] : no respiratory distress  [No Accessory Muscle Use] : no accessory muscle use [Regular Rhythm] : with a regular rhythm [Clear to Auscultation] : lungs were clear to auscultation bilaterally [Normal Rate] : normal rate  [No Rash] : no rash [Grossly Normal Strength/Tone] : grossly normal strength/tone [Normal S1, S2] : normal S1 and S2 [Normal Gait] : normal gait [No Focal Deficits] : no focal deficits [Normal Affect] : the affect was normal [Normal Insight/Judgement] : insight and judgment were intact

## 2020-01-17 DIAGNOSIS — Z20.828 CONTACT WITH AND (SUSPECTED) EXPOSURE TO OTHER VIRAL COMMUNICABLE DISEASES: ICD-10-CM

## 2020-03-02 ENCOUNTER — APPOINTMENT (OUTPATIENT)
Dept: RHEUMATOLOGY | Facility: CLINIC | Age: 40
End: 2020-03-02
Payer: MEDICAID

## 2020-03-02 VITALS
WEIGHT: 130 LBS | TEMPERATURE: 98.2 F | DIASTOLIC BLOOD PRESSURE: 80 MMHG | HEIGHT: 68 IN | HEART RATE: 85 BPM | OXYGEN SATURATION: 96 % | BODY MASS INDEX: 19.7 KG/M2 | SYSTOLIC BLOOD PRESSURE: 120 MMHG

## 2020-03-02 DIAGNOSIS — J32.9 CHRONIC SINUSITIS, UNSPECIFIED: ICD-10-CM

## 2020-03-02 DIAGNOSIS — M79.2 NEURALGIA AND NEURITIS, UNSPECIFIED: ICD-10-CM

## 2020-03-02 DIAGNOSIS — L85.3 XEROSIS CUTIS: ICD-10-CM

## 2020-03-02 PROCEDURE — 99204 OFFICE O/P NEW MOD 45 MIN: CPT

## 2020-03-02 RX ORDER — METHYLPREDNISOLONE 4 MG/1
4 TABLET ORAL
Qty: 1 | Refills: 0 | Status: DISCONTINUED | COMMUNITY
Start: 2020-01-10 | End: 2020-03-02

## 2020-03-02 RX ORDER — OSELTAMIVIR PHOSPHATE 75 MG/1
75 CAPSULE ORAL
Qty: 10 | Refills: 0 | Status: DISCONTINUED | COMMUNITY
Start: 2020-01-17 | End: 2020-03-02

## 2020-03-03 ENCOUNTER — APPOINTMENT (OUTPATIENT)
Dept: GASTROENTEROLOGY | Facility: CLINIC | Age: 40
End: 2020-03-03
Payer: MEDICAID

## 2020-03-03 VITALS
HEART RATE: 64 BPM | DIASTOLIC BLOOD PRESSURE: 72 MMHG | HEIGHT: 68 IN | BODY MASS INDEX: 19.7 KG/M2 | SYSTOLIC BLOOD PRESSURE: 109 MMHG | WEIGHT: 130 LBS

## 2020-03-03 PROCEDURE — 99214 OFFICE O/P EST MOD 30 MIN: CPT

## 2020-03-03 NOTE — PHYSICAL EXAM
[General Appearance - Alert] : alert [General Appearance - In No Acute Distress] : in no acute distress [Auscultation Breath Sounds / Voice Sounds] : lungs were clear to auscultation bilaterally [Heart Sounds] : normal S1 and S2 [Murmurs] : no murmurs [Heart Sounds Gallop] : no gallops [Heart Rate And Rhythm] : heart rate was normal and rhythm regular [Bowel Sounds] : normal bowel sounds [Heart Sounds Pericardial Friction Rub] : no pericardial rub [Abdomen Tenderness] : non-tender [] : no hepato-splenomegaly [Abdomen Soft] : soft [Abdomen Mass (___ Cm)] : no abdominal mass palpated [Abnormal Walk] : normal gait [FreeTextEntry1] : tender along right side inferior rib cage [Nail Clubbing] : no clubbing  or cyanosis of the fingernails [Motor Tone] : muscle strength and tone were normal [Musculoskeletal - Swelling] : no joint swelling seen [Oriented To Time, Place, And Person] : oriented to person, place, and time [Impaired Insight] : insight and judgment were intact [Affect] : the affect was normal

## 2020-03-03 NOTE — REVIEW OF SYSTEMS
[Red Eyes] : eyes not red [Eyesight Problems] : no eyesight problems [Discharge From Eyes] : no purulent discharge from the eyes [Dry Eyes] : dryness of the eyes [Shortness Of Breath] : no shortness of breath [Wheezing] : no wheezing [Cough] : cough [Incontinence] : no incontinence [Dysuria] : no dysuria [Pelvic Pain] : no pelvic pain [Dysmenorrhea] : no dysmenorrhea [Confused] : no confusion [Itching] : itching [Dizziness] : dizziness [Limb Weakness] : no limb weakness [Fainting] : no fainting [Difficulty Walking] : no difficulty walking [Negative] : Endocrine [de-identified] : tremors at times [FreeTextEntry8] : increased frequency

## 2020-03-03 NOTE — HISTORY OF PRESENT ILLNESS
[de-identified] : 40yo female for evaluation of GERD and ruq pain\par She has been getting atypical chest discomfort and gerd from ?hiatal hernia\par She has been taking omeprazole only intermittently\par She notes right sided pain along ribcage\par At times seems exacerbated by food\par She has sono with gall bladder polyp 6 months ago

## 2020-03-04 ENCOUNTER — APPOINTMENT (OUTPATIENT)
Dept: ULTRASOUND IMAGING | Facility: CLINIC | Age: 40
End: 2020-03-04
Payer: MEDICAID

## 2020-03-04 ENCOUNTER — OUTPATIENT (OUTPATIENT)
Dept: OUTPATIENT SERVICES | Facility: HOSPITAL | Age: 40
LOS: 1 days | End: 2020-03-04
Payer: MEDICAID

## 2020-03-04 DIAGNOSIS — K82.4 CHOLESTEROLOSIS OF GALLBLADDER: ICD-10-CM

## 2020-03-04 PROCEDURE — 76700 US EXAM ABDOM COMPLETE: CPT

## 2020-03-04 PROCEDURE — 76700 US EXAM ABDOM COMPLETE: CPT | Mod: 26

## 2020-03-04 NOTE — ASSESSMENT
[FreeTextEntry1] : THU FU is a 39 year old woman who presents with recurrent episodes of oral ulcers, with rheum w/u negative to date but consideration for possible Behcets however presently only meets the criteria of oral ulcers. + dry eyes, raynauds, distal paresthesias, and myriad other nonspecific symptoms of unclear etiology in this young female. Warrants further serological testing at present. \par \par - check labs as below including ANCA, sarcoid, myositis, HLA b51\par - obtain nasal polyp bx report for review\par - obtain optho notes for review, advised to have dedicated dry eye evaluation at next visit\par - RTC in 1 month to review

## 2020-03-04 NOTE — HISTORY OF PRESENT ILLNESS
[FreeTextEntry1] : THU FU is a 39 year old woman who presents for evaluation for possible inflammatory condition. \par \par + 4 episodes of recurrent diffuse, painful oral ulcers over the last 6 months\par + nasal polyps and sinusitis since 2007 -- bx Dr Perlman at [facility] in 2017 -- told it was nonspecific inflammation, these have recurred but not too bothersome\par + R sided UE/LE limb pain without synovitis > L, no stiffness, episodes take 4-5 days to resolve, no correlation to periods \par + diffuse dry skin but no dx of eczema or psoriasis, no other rashes \par + dry eyes, follows with optho at [place], reports negative Schirmers test  \par + b/l hip pain radiating to groin with arising from seated but resolves with a few steps, no LBP\par + dizziness intermittently, no falls \par + night sweats, No F/C, weight stable\par + mild Raynauds, no ulcerations or threatened digits\par + intermittent episodes where whole palm/foot feels hot but no associated color change\par + hiatal hernia\par + intermittent HA, stable\par \par Denies eye inflammation, IBD, genital ulcers, pathergy.  No vasculitic rashes. No other paresthesias or focal weakness. \par \par SLE ROS negative for alopecia, oral sicca, salivary gland swelling, malar rash, photosensitivity, SOB, chest pain, serositis, abd pain, dysuria, hematuria, rash, joint AM stiffness/synovitis, hematologic abnormalities. + 2 miscarriages, 2 pregnancies c/b subchorionic hematomas pregnancies \par \par FH - no autoimmune d/o\par Ethnically partially Latvian, Mongolian \par \par Labs - low Vit D\par Negative - RF/CCP, JINNY, dsDNA, Sjogrens, Scl-7-, HCV, HIV\par Normal CT a/p 2019\par MRI brain normal 2019\par EGD/Colon with bx negative

## 2020-03-04 NOTE — REVIEW OF SYSTEMS
[Negative] : Heme/Lymph [Feeling Poorly] : feeling poorly [Dry Eyes] : dryness of the eyes [Arthralgias] : arthralgias [As Noted in HPI] : as noted in HPI

## 2020-03-04 NOTE — PHYSICAL EXAM
[General Appearance - Alert] : alert [General Appearance - In No Acute Distress] : in no acute distress [Sclera] : the sclera and conjunctiva were normal [PERRL With Normal Accommodation] : pupils were equal in size, round, and reactive to light [Extraocular Movements] : extraocular movements were intact [Nasal Cavity] : the nasal mucosa and septum were normal [Outer Ear] : the ears and nose were normal in appearance [Oropharynx] : the oropharynx was normal [Neck Appearance] : the appearance of the neck was normal [Thyroid Diffuse Enlargement] : the thyroid was not enlarged [Respiration, Rhythm And Depth] : normal respiratory rhythm and effort [Heart Rate And Rhythm] : heart rate was normal and rhythm regular [Edema] : there was no peripheral edema [Murmurs] : no murmurs [Heart Sounds] : normal S1 and S2 [Abdomen Soft] : soft [Bowel Sounds] : normal bowel sounds [Abdomen Tenderness] : non-tender [Cervical Lymph Nodes Enlarged Posterior Bilaterally] : posterior cervical [Cervical Lymph Nodes Enlarged Anterior Bilaterally] : anterior cervical [Supraclavicular Lymph Nodes Enlarged Bilaterally] : supraclavicular [No CVA Tenderness] : no ~M costovertebral angle tenderness [No Spinal Tenderness] : no spinal tenderness [Abnormal Walk] : normal gait [Nail Clubbing] : no clubbing  or cyanosis of the fingernails [Musculoskeletal - Swelling] : no joint swelling seen [Skin Color & Pigmentation] : normal skin color and pigmentation [Motor Exam] : the motor exam was normal [] : no rash [No Focal Deficits] : no focal deficits [Oriented To Time, Place, And Person] : oriented to person, place, and time [Impaired Insight] : insight and judgment were intact [Affect] : the affect was normal [FreeTextEntry1] : dry skin

## 2020-04-16 ENCOUNTER — RX RENEWAL (OUTPATIENT)
Age: 40
End: 2020-04-16

## 2020-04-22 ENCOUNTER — TRANSCRIPTION ENCOUNTER (OUTPATIENT)
Age: 40
End: 2020-04-22

## 2020-04-27 ENCOUNTER — LABORATORY RESULT (OUTPATIENT)
Age: 40
End: 2020-04-27

## 2020-04-27 ENCOUNTER — APPOINTMENT (OUTPATIENT)
Dept: FAMILY MEDICINE | Facility: CLINIC | Age: 40
End: 2020-04-27
Payer: MEDICAID

## 2020-04-27 VITALS
DIASTOLIC BLOOD PRESSURE: 78 MMHG | BODY MASS INDEX: 19.7 KG/M2 | WEIGHT: 130 LBS | SYSTOLIC BLOOD PRESSURE: 126 MMHG | HEIGHT: 68 IN

## 2020-04-27 PROCEDURE — 99214 OFFICE O/P EST MOD 30 MIN: CPT

## 2020-04-27 RX ORDER — AMOXICILLIN AND CLAVULANATE POTASSIUM 875; 125 MG/1; MG/1
875-125 TABLET, COATED ORAL
Qty: 14 | Refills: 0 | Status: DISCONTINUED | COMMUNITY
Start: 2020-02-04 | End: 2020-02-04

## 2020-04-27 NOTE — PHYSICAL EXAM
[Normal Sclera/Conjunctiva] : normal sclera/conjunctiva [No Edema] : there was no peripheral edema [Normal] : no rash

## 2020-04-27 NOTE — HISTORY OF PRESENT ILLNESS
[de-identified] : Joint pain for which she is seeing Rheumatologist soon.\par Hand pain, feels a warm sensation. More at night. Was told that she has Raynauds. But she feels a warm sensation in her hands.\par Seen by Neurologist Dr Shoemaker. Noted to have C6 -7 Radiculopathy.\par Vit D deficiency Rx for vit D taken last dose next week. Reports that she did not take OTC after Rx completed at last physical and levels went down.\par Hx of Hiatal hernia and Thornton's. Seen by GI\par HLD wants labs done. Has gained 10 lbs since 2017. Unchanged from prior visit\par Concerned about sugar levels because of family Hx.\par  [FreeTextEntry1] : Follow up , would like to get blood work

## 2020-04-27 NOTE — ASSESSMENT
[FreeTextEntry1] : Cervical Radiculopathy/ Neuropathy\par Cymbalta 30 mg started.\par Discussed medication side effect.\par Vit D deficiency\par Check labs\par Take 200 IU after Rx completed if levels okay\par HLD: check labs\par Diet is good\par Arthralgia: has appointment with Rheumatologist Dr Warren\par will get labs prior to visit.\par Follow up in 1 month

## 2020-04-28 ENCOUNTER — TRANSCRIPTION ENCOUNTER (OUTPATIENT)
Age: 40
End: 2020-04-28

## 2020-04-28 LAB
24R-OH-CALCIDIOL SERPL-MCNC: 68.4 PG/ML
25(OH)D3 SERPL-MCNC: 25.4 NG/ML
ALBUMIN SERPL ELPH-MCNC: 4.1 G/DL
ALP BLD-CCNC: 57 U/L
ALT SERPL-CCNC: 9 U/L
ANION GAP SERPL CALC-SCNC: 15 MMOL/L
APPEARANCE: CLEAR
AST SERPL-CCNC: 18 U/L
BACTERIA: NEGATIVE
BILIRUB SERPL-MCNC: 0.5 MG/DL
BILIRUBIN URINE: NEGATIVE
BLOOD URINE: NEGATIVE
BUN SERPL-MCNC: 10 MG/DL
C3 SERPL-MCNC: 106 MG/DL
C4 SERPL-MCNC: 26 MG/DL
CALCIUM SERPL-MCNC: 9.4 MG/DL
CCP AB SER IA-ACNC: <8 UNITS
CENTROMERE IGG SER-ACNC: <0.2 CD:130001892
CHLORIDE SERPL-SCNC: 105 MMOL/L
CHOLEST SERPL-MCNC: 189 MG/DL
CHOLEST/HDLC SERPL: 4.7 RATIO
CO2 SERPL-SCNC: 23 MMOL/L
COLOR: COLORLESS
CREAT SERPL-MCNC: 0.93 MG/DL
CRP SERPL-MCNC: 0.23 MG/DL
DEPRECATED KAPPA LC FREE/LAMBDA SER: 1.25 RATIO
ENA JO1 AB SER IA-ACNC: <0.2 AL
ENA RNP AB SER IA-ACNC: <0.2 AL
ENA SM AB SER IA-ACNC: <0.2 AL
ERYTHROCYTE [SEDIMENTATION RATE] IN BLOOD BY WESTERGREN METHOD: 5 MM/HR
ESTIMATED AVERAGE GLUCOSE: 108 MG/DL
GLUCOSE QUALITATIVE U: NEGATIVE
GLUCOSE SERPL-MCNC: 88 MG/DL
HBA1C MFR BLD HPLC: 5.4 %
HDLC SERPL-MCNC: 41 MG/DL
HYALINE CASTS: 1 /LPF
IGA SER QL IEP: 187 MG/DL
IGG SER QL IEP: 913 MG/DL
IGM SER QL IEP: 164 MG/DL
KAPPA LC CSF-MCNC: 1.11 MG/DL
KAPPA LC SERPL-MCNC: 1.39 MG/DL
KETONES URINE: NORMAL
LDLC SERPL CALC-MCNC: 125 MG/DL
LEUKOCYTE ESTERASE URINE: ABNORMAL
MICROSCOPIC-UA: NORMAL
MPO AB + PR3 PNL SER: NORMAL
NITRITE URINE: NEGATIVE
PH URINE: 6
POTASSIUM SERPL-SCNC: 4.6 MMOL/L
PROT SERPL-MCNC: 6.5 G/DL
PROTEIN URINE: NEGATIVE
RED BLOOD CELLS URINE: 1 /HPF
RF+CCP IGG SER-IMP: NEGATIVE
SODIUM SERPL-SCNC: 143 MMOL/L
SPECIFIC GRAVITY URINE: 1.01
SQUAMOUS EPITHELIAL CELLS: 11 /HPF
T3 SERPL-MCNC: 169 NG/DL
T3FREE SERPL-MCNC: 3.51 PG/ML
T4 FREE SERPL-MCNC: 1.2 NG/DL
T4 SERPL-MCNC: 9.1 UG/DL
TRIGL SERPL-MCNC: 115 MG/DL
TSH SERPL-ACNC: 1.8 UIU/ML
UROBILINOGEN URINE: NORMAL
VIT B12 SERPL-MCNC: 301 PG/ML
WHITE BLOOD CELLS URINE: 16 /HPF

## 2020-04-29 LAB
ACE BLD-CCNC: 38 U/L
HLA-B27 RELATED AG QL: NORMAL
IGG SUBSET TOTAL IGG: 890 MG/DL
IGG1 SER-MCNC: 385 MG/DL
IGG2 SER-MCNC: 355 MG/DL
IGG3 SER-MCNC: 96 MG/DL
IGG4 SER-MCNC: 40 MG/DL

## 2020-05-04 ENCOUNTER — APPOINTMENT (OUTPATIENT)
Dept: RHEUMATOLOGY | Facility: CLINIC | Age: 40
End: 2020-05-04
Payer: MEDICAID

## 2020-05-04 DIAGNOSIS — K13.79 OTHER LESIONS OF ORAL MUCOSA: ICD-10-CM

## 2020-05-04 DIAGNOSIS — J33.9 NASAL POLYP, UNSPECIFIED: ICD-10-CM

## 2020-05-04 PROCEDURE — 99214 OFFICE O/P EST MOD 30 MIN: CPT | Mod: 95

## 2020-05-04 NOTE — REVIEW OF SYSTEMS
[Feeling Poorly] : feeling poorly [Dry Eyes] : dryness of the eyes [Eyes Itch] : itching of the eyes [Arthralgias] : arthralgias [As Noted in HPI] : as noted in HPI [Negative] : Endocrine

## 2020-05-04 NOTE — ASSESSMENT
[FreeTextEntry1] : THU FU is a 39 year old woman with multiple nonspecific complaints, rheum w/u negative to date, optho and ENT evaluations more consistent with allergic/chronic sinus issues. Recent b/l hands/feet warmth with prone position, ?radiculopathy vs vascular etiology. \par \par - medrol dose pack to see if improves current sx\par - advised trial of non drowsy antihistamine -- will send rx for Allegra\par - encouraged consistent use of artificial tears \par - c/w cymbalta at current dose \par - TEB in 10 days for f/u on effects of medrol dose pack

## 2020-05-04 NOTE — REASON FOR VISIT
[Home] : at home, [unfilled] , at the time of the visit. [Medical Office: ([facility])___] : at the medical office located in  [Patient] : the patient [Self] : self [Follow-Up: _____] : a [unfilled] follow-up visit [FreeTextEntry1] : ?Behcets, lab review, interval sx evaluation

## 2020-05-04 NOTE — HISTORY OF PRESENT ILLNESS
[FreeTextEntry1] : Since last visit, with increased frequency of warmth and vascular swelling over b/l hands/feet when lying down, happening nightly, no associated color changes or sweating. Mild ?raynauds with cold temp but no digital ulcers. + continued dry/itchy eyes, not using artifical tears. Sinusitis not active. Reports 2 oral ulcers, currently healing. No synovitis, HA, F/C, infectious sx, CP, SOB, abd pain, dysuria. \par \par Placed on cymbalta this week by PMD 2/2 concern that above complaints 2/2 known C6-7 radiculopathy. Reports mild pain over spine at top of back, no pain with ROM however. \par \par Reviewed labs, optho eval, ENT eval with patient - negative for rheumatologic condition at this time. ENT and Optho more consistent with allergies than inflammatory process.

## 2020-05-04 NOTE — PHYSICAL EXAM
[General Appearance - Alert] : alert [General Appearance - In No Acute Distress] : in no acute distress [Sclera] : the sclera and conjunctiva were normal [Extraocular Movements] : extraocular movements were intact [FreeTextEntry1] : No synovitis over hands/wrists on visual inspection, ROM intact  [Musculoskeletal - Swelling] : no joint swelling seen [Impaired Insight] : insight and judgment were intact [Oriented To Time, Place, And Person] : oriented to person, place, and time [Affect] : the affect was normal

## 2020-05-04 NOTE — PHYSICAL EXAM
[General Appearance - Alert] : alert [General Appearance - In No Acute Distress] : in no acute distress [Sclera] : the sclera and conjunctiva were normal [Extraocular Movements] : extraocular movements were intact [FreeTextEntry1] : No rash or raynauds over b/l hands on visual inspection  [Musculoskeletal - Swelling] : no joint swelling seen [Oriented To Time, Place, And Person] : oriented to person, place, and time [Impaired Insight] : insight and judgment were intact [Affect] : the affect was normal

## 2020-05-04 NOTE — REVIEW OF SYSTEMS
[Feeling Poorly] : feeling poorly [Dry Eyes] : dryness of the eyes [Eyes Itch] : itching of the eyes [Arthralgias] : arthralgias [As Noted in HPI] : as noted in HPI [Negative] : Psychiatric

## 2020-05-04 NOTE — REASON FOR VISIT
[Home] : at home, [unfilled] , at the time of the visit. [Medical Office: ([facility])___] : at the medical office located in  [Patient] : the patient [Self] : self [Follow-Up: _____] : a [unfilled] follow-up visit [FreeTextEntry1] : ?Behcets, lab review, interval sx evaluation

## 2020-05-11 LAB
MISCELLANEOUS TEST: NORMAL
PROC NAME: NORMAL

## 2020-05-14 ENCOUNTER — APPOINTMENT (OUTPATIENT)
Dept: RHEUMATOLOGY | Facility: CLINIC | Age: 40
End: 2020-05-14
Payer: MEDICAID

## 2020-05-14 DIAGNOSIS — L53.9 ERYTHEMATOUS CONDITION, UNSPECIFIED: ICD-10-CM

## 2020-05-14 PROCEDURE — 99213 OFFICE O/P EST LOW 20 MIN: CPT | Mod: 95

## 2020-05-14 RX ORDER — METHYLPREDNISOLONE 4 MG/1
4 TABLET ORAL
Qty: 1 | Refills: 0 | Status: COMPLETED | COMMUNITY
Start: 2020-05-04 | End: 2020-05-14

## 2020-05-14 NOTE — ASSESSMENT
[FreeTextEntry1] : THU FU is a 39 year old woman with multiple nonspecific complaints, rheum w/u negative to date, optho and ENT evaluations more consistent with allergic/chronic sinus issues. Recent b/l hands/feet warmth and erythema, ?radiculopathy vs vascular etiology as nonresponsive to steroids, now worsening but without classic rayanuds features. Improvement in C spine radiculopathy with steroids. \par \par \par - HLA B51 negative, discussed she does not meet criteria for Bechets at present \par - will send back to neurology for further eval of C spine radiculopathy as possible etiology of hand sx\par - PT referral when covid restrictions lifted for C spine radiculopathy \par - c/w Allegra for suspected allergic sx \par - encouraged consistent use of artificial tears \par - c/w Cymbalta at current dose \par - RPA s/p neurology w/u, discussed we can consider vascular eval and/or trial of CCB if develops more Raynauds consistent sx.

## 2020-05-14 NOTE — PHYSICAL EXAM
[General Appearance - Alert] : alert [General Appearance - In No Acute Distress] : in no acute distress [Sclera] : the sclera and conjunctiva were normal [Musculoskeletal - Swelling] : no joint swelling seen [FreeTextEntry1] : No rash or raynauds over b/l hands on visual inspection  [Oriented To Time, Place, And Person] : oriented to person, place, and time [Impaired Insight] : insight and judgment were intact [Affect] : the affect was normal

## 2020-05-14 NOTE — REASON FOR VISIT
[Home] : at home, [unfilled] , at the time of the visit. [Other Location: e.g. Home (Enter Location, City,State)___] : at [unfilled] [Patient] : the patient [Self] : self [Follow-Up: _____] : a [unfilled] follow-up visit [FreeTextEntry1] : ?Behcets, lab review, interval sx evaluation, f/u s/p med trial

## 2020-05-14 NOTE — HISTORY OF PRESENT ILLNESS
[FreeTextEntry1] : THU FU is a 39 year old woman who presents for evaluation for possible inflammatory condition. \par \par + 4 episodes of recurrent diffuse, painful oral ulcers over the last 6 months\par + nasal polyps and sinusitis since 2007 -- bx Dr Perlman at [facility] in 2017 -- told it was nonspecific inflammation, these have recurred but not too bothersome\par + R sided UE/LE limb pain without synovitis > L, no stiffness, episodes take 4-5 days to resolve, no correlation to periods \par + diffuse dry skin but no dx of eczema or psoriasis, no other rashes \par + dry eyes, follows with optho at [place], reports negative Schirmers test  \par + b/l hip pain radiating to groin with arising from seated but resolves with a few steps, no LBP\par + dizziness intermittently, no falls \par + night sweats, No F/C, weight stable\par + mild Raynauds, no ulcerations or threatened digits\par + intermittent episodes where whole palm/foot feels hot but no associated color change\par + hiatal hernia\par + intermittent HA, stable\par \par Denies eye inflammation, IBD, genital ulcers, pathergy.  No vasculitic rashes. No other paresthesias or focal weakness. \par \par SLE ROS negative for alopecia, oral sicca, salivary gland swelling, malar rash, photosensitivity, SOB, chest pain, serositis, abd pain, dysuria, hematuria, rash, joint AM stiffness/synovitis, hematologic abnormalities. + 2 miscarriages, 2 pregnancies c/b subchorionic hematomas pregnancies \par \par FH - no autoimmune d/o\par Ethnically partially [place]ian, Kyrgyz \par \par Labs - low Vit D\par Negative - RF/CCP, JINNY, dsDNA, Sjogrens, Scl-7-, HCV, HIV\par Normal CT a/p 2019\par MRI brain normal 2019\par EGD/Colon with bx negative

## 2020-05-14 NOTE — REVIEW OF SYSTEMS
[Dry Eyes] : dryness of the eyes [Feeling Poorly] : feeling poorly [Eyes Itch] : itching of the eyes [Arthralgias] : arthralgias [As Noted in HPI] : as noted in HPI [de-identified] : palmar erythema/warmth/swelling  [Negative] : Heme/Lymph

## 2020-06-01 ENCOUNTER — APPOINTMENT (OUTPATIENT)
Dept: RHEUMATOLOGY | Facility: CLINIC | Age: 40
End: 2020-06-01

## 2020-06-29 ENCOUNTER — RX RENEWAL (OUTPATIENT)
Age: 40
End: 2020-06-29

## 2020-07-15 ENCOUNTER — APPOINTMENT (OUTPATIENT)
Dept: OBGYN | Facility: CLINIC | Age: 40
End: 2020-07-15

## 2020-07-29 ENCOUNTER — RESULT CHARGE (OUTPATIENT)
Age: 40
End: 2020-07-29

## 2020-07-29 ENCOUNTER — APPOINTMENT (OUTPATIENT)
Dept: OBGYN | Facility: CLINIC | Age: 40
End: 2020-07-29
Payer: MEDICAID

## 2020-07-29 VITALS
TEMPERATURE: 98.4 F | DIASTOLIC BLOOD PRESSURE: 60 MMHG | WEIGHT: 129 LBS | RESPIRATION RATE: 16 BRPM | BODY MASS INDEX: 19.55 KG/M2 | HEIGHT: 68 IN | SYSTOLIC BLOOD PRESSURE: 92 MMHG

## 2020-07-29 DIAGNOSIS — Z30.41 ENCOUNTER FOR SURVEILLANCE OF CONTRACEPTIVE PILLS: ICD-10-CM

## 2020-07-29 DIAGNOSIS — Z32.02 ENCOUNTER FOR PREGNANCY TEST, RESULT NEGATIVE: ICD-10-CM

## 2020-07-29 DIAGNOSIS — L72.3 SEBACEOUS CYST: ICD-10-CM

## 2020-07-29 LAB
HCG UR QL: NEGATIVE
QUALITY CONTROL: YES

## 2020-07-29 PROCEDURE — 99213 OFFICE O/P EST LOW 20 MIN: CPT

## 2020-07-29 PROCEDURE — 81025 URINE PREGNANCY TEST: CPT

## 2020-07-29 NOTE — COUNSELING
[Breast Self Exam] : breast self exam [Exercise] : exercise [Nutrition] : nutrition [Vitamins/Supplements] : vitamins/supplements [Contraception] : contraception [Medication Management] : medication management [Weight Management] : weight management

## 2020-09-18 ENCOUNTER — TRANSCRIPTION ENCOUNTER (OUTPATIENT)
Age: 40
End: 2020-09-18

## 2020-11-09 ENCOUNTER — APPOINTMENT (OUTPATIENT)
Dept: OBGYN | Facility: CLINIC | Age: 40
End: 2020-11-09

## 2020-11-22 ENCOUNTER — EMERGENCY (EMERGENCY)
Facility: HOSPITAL | Age: 40
LOS: 0 days | Discharge: HOME | End: 2020-11-22
Attending: EMERGENCY MEDICINE
Payer: MEDICAID

## 2020-11-22 VITALS
HEART RATE: 77 BPM | TEMPERATURE: 99 F | RESPIRATION RATE: 18 BRPM | DIASTOLIC BLOOD PRESSURE: 80 MMHG | SYSTOLIC BLOOD PRESSURE: 120 MMHG | OXYGEN SATURATION: 100 %

## 2020-11-22 VITALS — WEIGHT: 130.07 LBS

## 2020-11-22 DIAGNOSIS — R10.11 RIGHT UPPER QUADRANT PAIN: ICD-10-CM

## 2020-11-22 DIAGNOSIS — M25.511 PAIN IN RIGHT SHOULDER: ICD-10-CM

## 2020-11-22 DIAGNOSIS — Z88.5 ALLERGY STATUS TO NARCOTIC AGENT: ICD-10-CM

## 2020-11-22 DIAGNOSIS — M54.9 DORSALGIA, UNSPECIFIED: ICD-10-CM

## 2020-11-22 DIAGNOSIS — K82.4 CHOLESTEROLOSIS OF GALLBLADDER: ICD-10-CM

## 2020-11-22 LAB
ALBUMIN SERPL ELPH-MCNC: 4.1 G/DL — SIGNIFICANT CHANGE UP (ref 3.3–5)
ALP SERPL-CCNC: 89 U/L — SIGNIFICANT CHANGE UP (ref 40–120)
ALT FLD-CCNC: 19 U/L — SIGNIFICANT CHANGE UP (ref 12–78)
ANION GAP SERPL CALC-SCNC: 7 MMOL/L — SIGNIFICANT CHANGE UP (ref 5–17)
APPEARANCE UR: CLEAR — SIGNIFICANT CHANGE UP
AST SERPL-CCNC: 16 U/L — SIGNIFICANT CHANGE UP (ref 15–37)
BACTERIA # UR AUTO: ABNORMAL
BASOPHILS # BLD AUTO: 0.04 K/UL — SIGNIFICANT CHANGE UP (ref 0–0.2)
BASOPHILS NFR BLD AUTO: 0.7 % — SIGNIFICANT CHANGE UP (ref 0–2)
BILIRUB SERPL-MCNC: 0.8 MG/DL — SIGNIFICANT CHANGE UP (ref 0.2–1.2)
BILIRUB UR-MCNC: NEGATIVE — SIGNIFICANT CHANGE UP
BUN SERPL-MCNC: 11 MG/DL — SIGNIFICANT CHANGE UP (ref 7–23)
CALCIUM SERPL-MCNC: 9.3 MG/DL — SIGNIFICANT CHANGE UP (ref 8.5–10.1)
CHLORIDE SERPL-SCNC: 107 MMOL/L — SIGNIFICANT CHANGE UP (ref 96–108)
CO2 SERPL-SCNC: 26 MMOL/L — SIGNIFICANT CHANGE UP (ref 22–31)
COLOR SPEC: YELLOW — SIGNIFICANT CHANGE UP
CREAT SERPL-MCNC: 0.94 MG/DL — SIGNIFICANT CHANGE UP (ref 0.5–1.3)
DIFF PNL FLD: ABNORMAL
EOSINOPHIL # BLD AUTO: 0.1 K/UL — SIGNIFICANT CHANGE UP (ref 0–0.5)
EOSINOPHIL NFR BLD AUTO: 1.7 % — SIGNIFICANT CHANGE UP (ref 0–6)
EPI CELLS # UR: SIGNIFICANT CHANGE UP
GLUCOSE SERPL-MCNC: 79 MG/DL — SIGNIFICANT CHANGE UP (ref 70–99)
GLUCOSE UR QL: NEGATIVE MG/DL — SIGNIFICANT CHANGE UP
HCT VFR BLD CALC: 37.4 % — SIGNIFICANT CHANGE UP (ref 34.5–45)
HGB BLD-MCNC: 11.9 G/DL — SIGNIFICANT CHANGE UP (ref 11.5–15.5)
IMM GRANULOCYTES NFR BLD AUTO: 0.2 % — SIGNIFICANT CHANGE UP (ref 0–1.5)
KETONES UR-MCNC: ABNORMAL
LEUKOCYTE ESTERASE UR-ACNC: ABNORMAL
LIDOCAIN IGE QN: 86 U/L — SIGNIFICANT CHANGE UP (ref 73–393)
LYMPHOCYTES # BLD AUTO: 1.05 K/UL — SIGNIFICANT CHANGE UP (ref 1–3.3)
LYMPHOCYTES # BLD AUTO: 17.7 % — SIGNIFICANT CHANGE UP (ref 13–44)
MCHC RBC-ENTMCNC: 30.2 PG — SIGNIFICANT CHANGE UP (ref 27–34)
MCHC RBC-ENTMCNC: 31.8 GM/DL — LOW (ref 32–36)
MCV RBC AUTO: 94.9 FL — SIGNIFICANT CHANGE UP (ref 80–100)
MONOCYTES # BLD AUTO: 0.38 K/UL — SIGNIFICANT CHANGE UP (ref 0–0.9)
MONOCYTES NFR BLD AUTO: 6.4 % — SIGNIFICANT CHANGE UP (ref 2–14)
NEUTROPHILS # BLD AUTO: 4.34 K/UL — SIGNIFICANT CHANGE UP (ref 1.8–7.4)
NEUTROPHILS NFR BLD AUTO: 73.3 % — SIGNIFICANT CHANGE UP (ref 43–77)
NITRITE UR-MCNC: NEGATIVE — SIGNIFICANT CHANGE UP
PH UR: 5 — SIGNIFICANT CHANGE UP (ref 5–8)
PLATELET # BLD AUTO: 226 K/UL — SIGNIFICANT CHANGE UP (ref 150–400)
POTASSIUM SERPL-MCNC: 3.3 MMOL/L — LOW (ref 3.5–5.3)
POTASSIUM SERPL-SCNC: 3.3 MMOL/L — LOW (ref 3.5–5.3)
PROT SERPL-MCNC: 7.9 GM/DL — SIGNIFICANT CHANGE UP (ref 6–8.3)
PROT UR-MCNC: NEGATIVE MG/DL — SIGNIFICANT CHANGE UP
RBC # BLD: 3.94 M/UL — SIGNIFICANT CHANGE UP (ref 3.8–5.2)
RBC # FLD: 13.5 % — SIGNIFICANT CHANGE UP (ref 10.3–14.5)
RBC CASTS # UR COMP ASSIST: SIGNIFICANT CHANGE UP /HPF (ref 0–4)
SODIUM SERPL-SCNC: 140 MMOL/L — SIGNIFICANT CHANGE UP (ref 135–145)
SP GR SPEC: 1.01 — SIGNIFICANT CHANGE UP (ref 1.01–1.02)
TROPONIN I SERPL-MCNC: <0.015 NG/ML — SIGNIFICANT CHANGE UP (ref 0.01–0.04)
UROBILINOGEN FLD QL: NEGATIVE MG/DL — SIGNIFICANT CHANGE UP
WBC # BLD: 5.92 K/UL — SIGNIFICANT CHANGE UP (ref 3.8–10.5)
WBC # FLD AUTO: 5.92 K/UL — SIGNIFICANT CHANGE UP (ref 3.8–10.5)
WBC UR QL: ABNORMAL

## 2020-11-22 PROCEDURE — 36415 COLL VENOUS BLD VENIPUNCTURE: CPT

## 2020-11-22 PROCEDURE — 71046 X-RAY EXAM CHEST 2 VIEWS: CPT

## 2020-11-22 PROCEDURE — 80053 COMPREHEN METABOLIC PANEL: CPT

## 2020-11-22 PROCEDURE — 76705 ECHO EXAM OF ABDOMEN: CPT

## 2020-11-22 PROCEDURE — 81001 URINALYSIS AUTO W/SCOPE: CPT

## 2020-11-22 PROCEDURE — 84484 ASSAY OF TROPONIN QUANT: CPT

## 2020-11-22 PROCEDURE — 85025 COMPLETE CBC W/AUTO DIFF WBC: CPT

## 2020-11-22 PROCEDURE — 76705 ECHO EXAM OF ABDOMEN: CPT | Mod: 26

## 2020-11-22 PROCEDURE — 83690 ASSAY OF LIPASE: CPT

## 2020-11-22 PROCEDURE — 99284 EMERGENCY DEPT VISIT MOD MDM: CPT | Mod: 25

## 2020-11-22 PROCEDURE — 93010 ELECTROCARDIOGRAM REPORT: CPT

## 2020-11-22 PROCEDURE — 99284 EMERGENCY DEPT VISIT MOD MDM: CPT

## 2020-11-22 PROCEDURE — 71046 X-RAY EXAM CHEST 2 VIEWS: CPT | Mod: 26

## 2020-11-22 PROCEDURE — 96374 THER/PROPH/DIAG INJ IV PUSH: CPT

## 2020-11-22 PROCEDURE — 93005 ELECTROCARDIOGRAM TRACING: CPT

## 2020-11-22 RX ORDER — KETOROLAC TROMETHAMINE 30 MG/ML
15 SYRINGE (ML) INJECTION ONCE
Refills: 0 | Status: DISCONTINUED | OUTPATIENT
Start: 2020-11-22 | End: 2020-11-22

## 2020-11-22 RX ADMIN — Medication 15 MILLIGRAM(S): at 13:33

## 2020-11-22 NOTE — ED ADULT TRIAGE NOTE - CHIEF COMPLAINT QUOTE
pt states for the apst few weeks she has had dull right sided flank pain that has now radiated to the right shoulder and RUQ abdominal region. pt states sicne yesterday she has had loss of appetie. pt denies fevers, fall trauma NVD. pt states she has a hxx of a polyp on her gallbladder and has concerns for gallbladder issue.

## 2020-11-22 NOTE — ED STATDOCS - MUSCULOSKELETAL, MLM
range of motion is not limited +anterior and mid axillary rib tenderness, perispinal thoracic back tenderness

## 2020-11-22 NOTE — ED STATDOCS - PATIENT PORTAL LINK FT
You can access the FollowMyHealth Patient Portal offered by [facility] by registering at the following website: [email or web address]. By joining [facility]’s FollowMyHealth portal, you will also be able to view your health information using other applications (apps) compatible with our system.

## 2020-11-22 NOTE — ED STATDOCS - PROGRESS NOTE DETAILS
signed Mindy Sims PA-C Pt seen initially in intake by Dr Chao.   40F c/o RUQ/back pain x 3 weeks. Similar pain in the past and hx of a gall bladder polyp. No significant findings on labwork or imaging, gb polyp still noted in sono unchanged. Pt alert, NAD, still with some discomfort though improved after meds in ED. Recommend she f/u with her GI [place] and PMD [place]. return precautions given. Pt feeling well at DC, agrees with DC and plan of care. Story not suspicious for acs.  Low risk HEART score.  EKG unremarkable.  Trop x1 negative - sufficient for eval given  timing  of symptoms.  Low risk PE and perc negative.  No e/o ptx/pna/carditis.  Story not c/w dissection - below threshold for further w/u.  No e/o armida or pancreatitis.  Pt well appearing.  Stable for outpatient f/u.  D/c home with strict return precautions and prompt outpatient f/u.

## 2020-11-22 NOTE — ED ADULT NURSE NOTE - NSIMPLEMENTINTERV_GEN_ALL_ED
Implemented All Universal Safety Interventions:  [place] to call system. Call bell, personal items and telephone within reach. Instruct patient to call for assistance. Room bathroom lighting operational. Non-slip footwear when patient is off stretcher. Physically safe environment: no spills, clutter or unnecessary equipment. Stretcher in lowest position, wheels locked, appropriate side rails in place.

## 2020-11-22 NOTE — ED STATDOCS - CLINICAL SUMMARY MEDICAL DECISION MAKING FREE TEXT BOX
Story and exam most con with MSK pain. Will evaluation for ASC, heart score is 0, very low risk. Low risk PE, is PERC negative, Dispo pending labs, reassessment. Story and exam most con with MSK pain. Will evaluate for ACS, heart score is 0, low risk. Low risk PE, is PERC negative, Dispo pending labs, reassessment.

## 2020-11-22 NOTE — ED STATDOCS - OBJECTIVE STATEMENT
41 y/o female with PMHx of polyp on gallbladder, hiatal hernia presents to the ED c/o RUQ abd pain radiating to back, shoulder for the past few weeks. Pain described as dull, however yesterday pt states she experienced a loss of appetite. States pain feels similar to when she was diagnosed with gallbladder polyp. Denies fall, trauma, N/V/D, dysuria, hematuria. No recent travel. Recently stopped birth control 3 months ago.

## 2020-11-22 NOTE — ED STATDOCS - NSFOLLOWUPINSTRUCTIONS_ED_ALL_ED_FT
Acute Abdominal Pain    WHAT YOU NEED TO KNOW:    The cause of your abdominal pain may not be found. If a cause is found, treatment will depend on what the cause is.     DISCHARGE INSTRUCTIONS:    Return to the emergency department if:     You vomit blood or cannot stop vomiting.      You have blood in your bowel movement or it looks like tar.       You have bleeding from your rectum.       Your abdomen is larger than usual, more painful, and hard.       You have severe pain in your abdomen.       You stop passing gas and having bowel movements.       You feel weak, dizzy, or faint.    Contact your healthcare provider if:     You have a fever.      You have new signs and symptoms.      Your symptoms do not get better with treatment.       You have questions or concerns about your condition or care.    Medicines may be given to decrease pain, treat an infection, and manage your symptoms. Take your medicine as directed. Call your healthcare provider if you think your medicine is not helping or if you have side effects. Tell him if you are allergic to any medicine. Keep a list of the medicines, vitamins, and herbs you take. Include the amounts, and when and why you take them. Bring the list or the pill bottles to follow-up visits. Carry your medicine list with you in case of an emergency.    Manage your symptoms:     Apply heat on your abdomen for 20 to 30 minutes every 2 hours for as many days as directed. Heat helps decrease pain and muscle spasms.       Manage your stress. Stress may cause abdominal pain. Your healthcare provider may recommend relaxation techniques and deep breathing exercises to help decrease your stress. Your healthcare provider may recommend you talk to someone about your stress or anxiety, such as a counselor or a trusted friend. Get plenty of sleep and exercise regularly.       Limit or do not drink alcohol. Alcohol can make your abdominal pain worse. Ask your healthcare provider if it is safe for you to drink alcohol. Also ask how much is safe for you to drink.       Do not smoke. Nicotine and other chemicals in cigarettes can damage your esophagus and stomach. Ask your healthcare provider for information if you currently smoke and need help to quit. E-cigarettes or smokeless tobacco still contain nicotine. Talk to your healthcare provider before you use these products.     Make changes to the food you eat as directed: Do not eat foods that cause abdominal pain or other symptoms. Eat small meals more often.     Eat more high-fiber foods if you are constipated. High-fiber foods include fruits, vegetables, whole-grain foods, and legumes.       Do not eat foods that cause gas if you have bloating. Examples include broccoli, cabbage, and cauliflower. Do not drink soda or carbonated drinks, because these may also cause gas.       Do not eat foods or drinks that contain sorbitol or fructose if you have diarrhea and bloating. Some examples are fruit juices, candy, jelly, and sugar-free gum.       Do not eat high-fat foods, such as fried foods, cheeseburgers, hot dogs, and desserts.      Limit or do not drink caffeine. Caffeine may make symptoms, such as heart burn or nausea, worse.       Drink plenty of liquids to prevent dehydration from diarrhea or vomiting. Ask your healthcare provider how much liquid to drink each day and which liquids are best for you.     Follow up with your healthcare provider as directed: Write down your questions so you remember to ask them during your visits.     FOLLOW UP WITH YOUR PRIMARY DOCTOR OR GASTROENTEROLOGIST THIS WEEK. CALL THE OFFICE TO MAKE AN APPOINTMENT. RETURN TO THE ER FOR ANY WORSENING SYMPTOMS OR NEW CONCERNS.

## 2020-11-22 NOTE — ED ADULT NURSE NOTE - OBJECTIVE STATEMENT
Pt is a 40y female A &o x 4, VSS, presents to ED c/o right flank pain radiates across epigastric area, Pt denies N/V/D.

## 2020-11-24 PROBLEM — K44.9 DIAPHRAGMATIC HERNIA WITHOUT OBSTRUCTION OR GANGRENE: Chronic | Status: ACTIVE | Noted: 2020-11-23

## 2020-12-04 ENCOUNTER — APPOINTMENT (OUTPATIENT)
Dept: GASTROENTEROLOGY | Facility: CLINIC | Age: 40
End: 2020-12-04
Payer: MEDICAID

## 2020-12-04 PROCEDURE — 99213 OFFICE O/P EST LOW 20 MIN: CPT

## 2020-12-04 PROCEDURE — 99072 ADDL SUPL MATRL&STAF TM PHE: CPT

## 2020-12-04 NOTE — REVIEW OF SYSTEMS
[Feeling Poorly] : feeling poorly [As Noted in HPI] : as noted in HPI [Abdominal Pain] : abdominal pain [Heartburn] : heartburn [Negative] : Heme/Lymph

## 2020-12-04 NOTE — REASON FOR VISIT
[Home] : at home, [unfilled] , at the time of the visit. [Medical Office: ([facility])___] : at the medical office located in  [Verbal consent obtained from patient] : the patient, [unfilled] [Follow-Up: _____] : a [unfilled] follow-up visit

## 2020-12-09 ENCOUNTER — APPOINTMENT (OUTPATIENT)
Dept: FAMILY MEDICINE | Facility: CLINIC | Age: 40
End: 2020-12-09

## 2020-12-15 NOTE — HISTORY OF PRESENT ILLNESS
[de-identified] : 41 yo female on phone today with c/o ruq abdominal pain that radiates to her shoulder.  Lasts for several hours a day.  It is intermittent but mostly present for past few days.  Was in HH and sonogram was unremarkable and bw was fine.  Pt reports MERARI and does get acid in her mouth as well.  Does disclose hx of BE in past and hiatal hernia and is on omeprazole 20mg at bedtime with no relief. No fevers, n/v.

## 2020-12-15 NOTE — ASSESSMENT
[FreeTextEntry1] : 39 yo female on phone today with c/o ruq abdominal pain that radiates to her shoulder.  Lasts for several hours a day.  It is intermittent but mostly present for past few days.  Also with reflux and no relief on ppi.  \par \par Unclear if biliary disease vs. gastritis/PUD? \par \par Plan:\par GERD/low fat diet. \par Start PPI BID now, and will check HIDA scan. \par Reviewed sonogram with blood work with pt on phone. \par Pt to f/u in office. \par \par Discussed with Dr. Lee.

## 2020-12-23 PROBLEM — Z11.1 ENCOUNTER FOR PPD SKIN TEST READING: Status: RESOLVED | Noted: 2020-01-10 | Resolved: 2020-12-23

## 2021-01-04 ENCOUNTER — TRANSCRIPTION ENCOUNTER (OUTPATIENT)
Age: 41
End: 2021-01-04

## 2021-01-12 ENCOUNTER — APPOINTMENT (OUTPATIENT)
Dept: FAMILY MEDICINE | Facility: CLINIC | Age: 41
End: 2021-01-12
Payer: MEDICAID

## 2021-01-12 ENCOUNTER — NON-APPOINTMENT (OUTPATIENT)
Age: 41
End: 2021-01-12

## 2021-01-12 VITALS
OXYGEN SATURATION: 99 % | HEART RATE: 89 BPM | HEIGHT: 68 IN | SYSTOLIC BLOOD PRESSURE: 122 MMHG | WEIGHT: 133 LBS | RESPIRATION RATE: 16 BRPM | BODY MASS INDEX: 20.16 KG/M2 | DIASTOLIC BLOOD PRESSURE: 64 MMHG | TEMPERATURE: 98.1 F

## 2021-01-12 DIAGNOSIS — M77.8 OTHER ENTHESOPATHIES, NOT ELSEWHERE CLASSIFIED: ICD-10-CM

## 2021-01-12 PROCEDURE — 99214 OFFICE O/P EST MOD 30 MIN: CPT

## 2021-01-12 PROCEDURE — 99072 ADDL SUPL MATRL&STAF TM PHE: CPT

## 2021-01-12 RX ORDER — AZITHROMYCIN 250 MG/1
250 TABLET, FILM COATED ORAL
Qty: 6 | Refills: 3 | Status: COMPLETED | COMMUNITY
Start: 2020-07-29 | End: 2020-08-07

## 2021-01-13 NOTE — HISTORY OF PRESENT ILLNESS
[FreeTextEntry1] : follow up [de-identified] : Lower chest upper abdominal pain for a few months\par Seen by Dr Lee US done. Has severe pain seen in [place] ER. US repeated. HIDA scan done subsequently which was negative. \par Was prescribed dicyclomine. It helped initially but now not working. It is a constant pain in the right lower chest and in the back.\par Has follow up scheduled\par \par Pain near the right elbow when she moves the arm a certain way.\par \par wants cholesterol checked.

## 2021-01-13 NOTE — ASSESSMENT
[FreeTextEntry1] : Chest pain persistent and now worsening\par US did not give any information and patient continues to have pain daily.\par Lower chest upper abdominal pain for a few months\par Seen by Dr Lee US done. Has severe pain seen in [place] ER. US repeated. HIDA scan done subsequently which was negative. \par Was prescribed dicyclomine. It helped initially but now not working. It is a constant pain in the right lower chest and in the back.\par Has follow up scheduled\par \par Chest CT ordered\par \par Right Elbow tendonitis\par NSAIDs avoid heavy lifting, repetitive movements

## 2021-01-19 ENCOUNTER — APPOINTMENT (OUTPATIENT)
Dept: GASTROENTEROLOGY | Facility: CLINIC | Age: 41
End: 2021-01-19
Payer: MEDICAID

## 2021-01-19 VITALS
DIASTOLIC BLOOD PRESSURE: 84 MMHG | TEMPERATURE: 98 F | HEART RATE: 97 BPM | SYSTOLIC BLOOD PRESSURE: 123 MMHG | HEIGHT: 68 IN | BODY MASS INDEX: 19.7 KG/M2 | WEIGHT: 130 LBS

## 2021-01-19 DIAGNOSIS — R10.84 GENERALIZED ABDOMINAL PAIN: ICD-10-CM

## 2021-01-19 DIAGNOSIS — K82.4 CHOLESTEROLOSIS OF GALLBLADDER: ICD-10-CM

## 2021-01-19 DIAGNOSIS — Z09 ENCOUNTER FOR FOLLOW-UP EXAMINATION AFTER COMPLETED TREATMENT FOR CONDITIONS OTHER THAN MALIGNANT NEOPLASM: ICD-10-CM

## 2021-01-19 PROCEDURE — 99072 ADDL SUPL MATRL&STAF TM PHE: CPT

## 2021-01-19 PROCEDURE — 99214 OFFICE O/P EST MOD 30 MIN: CPT

## 2021-01-19 NOTE — HISTORY OF PRESENT ILLNESS
[de-identified] : 41yo female with atypical gerd at night, right sided pain\par Much improved on Omeprazole for couple of years for night regurgitation. 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 Sono only with gb polyp, HIDA negative. ER visit with labs and sono reviewed by me 11/20\par Empirically placed on dicylomine with some improvement in pain but then exacerbation again. She saw Dr Vasquez and cori helped her pain but caused some epigastric discomfort\par \par narpoxen helped for 3 dsasy but developed severe epigastric pain

## 2021-01-19 NOTE — ASSESSMENT
[FreeTextEntry1] : 41yo female \par continue omperazole at night\par elevate head of bead further\par \par right sided pain likely due to combo of inestinal spasm and MS pain given improvement with dicyclomine and naprosyn\par \par will add carafate suspension to help possible NSAID induced gastritis\par \par awaiting ct scan from dr Vasquez

## 2021-01-19 NOTE — PHYSICAL EXAM
[General Appearance - Alert] : alert [General Appearance - In No Acute Distress] : in no acute distress [Neck Appearance] : the appearance of the neck was normal [Neck Cervical Mass (___cm)] : no neck mass was observed [Jugular Venous Distention Increased] : there was no jugular-venous distention [Thyroid Diffuse Enlargement] : the thyroid was not enlarged [Thyroid Nodule] : there were no palpable thyroid nodules [Auscultation Breath Sounds / Voice Sounds] : lungs were clear to auscultation bilaterally [Heart Rate And Rhythm] : heart rate was normal and rhythm regular [Heart Sounds] : normal S1 and S2 [Heart Sounds Gallop] : no gallops [Murmurs] : no murmurs [Heart Sounds Pericardial Friction Rub] : no pericardial rub [Full Pulse] : the pedal pulses are present [Edema] : there was no peripheral edema [Bowel Sounds] : normal bowel sounds [Abdomen Soft] : soft [Abdomen Tenderness] : non-tender [] : no hepato-splenomegaly [Abdomen Mass (___ Cm)] : no abdominal mass palpated [Abnormal Walk] : normal gait [Nail Clubbing] : no clubbing  or cyanosis of the fingernails [Musculoskeletal - Swelling] : no joint swelling seen [Motor Tone] : muscle strength and tone were normal [Oriented To Time, Place, And Person] : oriented to person, place, and time [Impaired Insight] : insight and judgment were intact [Affect] : the affect was normal [FreeTextEntry1] : tender along right side inferior rib cage

## 2021-01-19 NOTE — REVIEW OF SYSTEMS
[As Noted in HPI] : as noted in HPI [Abdominal Pain] : abdominal pain [Constipation] : constipation [Heartburn] : heartburn [Joint Pain] : joint pain [Negative] : Heme/Lymph [FreeTextEntry9] : [facility] elbow

## 2021-01-22 ENCOUNTER — TRANSCRIPTION ENCOUNTER (OUTPATIENT)
Age: 41
End: 2021-01-22

## 2021-01-26 ENCOUNTER — TRANSCRIPTION ENCOUNTER (OUTPATIENT)
Age: 41
End: 2021-01-26

## 2021-01-28 ENCOUNTER — TRANSCRIPTION ENCOUNTER (OUTPATIENT)
Age: 41
End: 2021-01-28

## 2021-02-03 ENCOUNTER — TRANSCRIPTION ENCOUNTER (OUTPATIENT)
Age: 41
End: 2021-02-03

## 2021-02-05 ENCOUNTER — TRANSCRIPTION ENCOUNTER (OUTPATIENT)
Age: 41
End: 2021-02-05

## 2021-02-07 ENCOUNTER — TRANSCRIPTION ENCOUNTER (OUTPATIENT)
Age: 41
End: 2021-02-07

## 2021-02-09 ENCOUNTER — TRANSCRIPTION ENCOUNTER (OUTPATIENT)
Age: 41
End: 2021-02-09

## 2021-02-10 ENCOUNTER — RESULT REVIEW (OUTPATIENT)
Age: 41
End: 2021-02-10

## 2021-02-10 ENCOUNTER — APPOINTMENT (OUTPATIENT)
Dept: CT IMAGING | Facility: CLINIC | Age: 41
End: 2021-02-10
Payer: MEDICAID

## 2021-02-10 ENCOUNTER — OUTPATIENT (OUTPATIENT)
Dept: OUTPATIENT SERVICES | Facility: HOSPITAL | Age: 41
LOS: 1 days | End: 2021-02-10
Payer: MEDICAID

## 2021-02-10 DIAGNOSIS — R07.89 OTHER CHEST PAIN: ICD-10-CM

## 2021-02-10 PROCEDURE — 71260 CT THORAX DX C+: CPT | Mod: 26

## 2021-02-10 PROCEDURE — 71260 CT THORAX DX C+: CPT

## 2021-02-11 ENCOUNTER — APPOINTMENT (OUTPATIENT)
Dept: FAMILY MEDICINE | Facility: CLINIC | Age: 41
End: 2021-02-11
Payer: MEDICAID

## 2021-02-11 VITALS
DIASTOLIC BLOOD PRESSURE: 78 MMHG | HEIGHT: 68 IN | BODY MASS INDEX: 19.85 KG/M2 | RESPIRATION RATE: 16 BRPM | OXYGEN SATURATION: 99 % | HEART RATE: 87 BPM | SYSTOLIC BLOOD PRESSURE: 104 MMHG | WEIGHT: 131 LBS

## 2021-02-11 DIAGNOSIS — M25.50 PAIN IN UNSPECIFIED JOINT: ICD-10-CM

## 2021-02-11 PROCEDURE — 99072 ADDL SUPL MATRL&STAF TM PHE: CPT

## 2021-02-11 PROCEDURE — 99214 OFFICE O/P EST MOD 30 MIN: CPT

## 2021-02-11 RX ORDER — FEXOFENADINE HYDROCHLORIDE 180 MG/1
180 TABLET ORAL DAILY
Qty: 30 | Refills: 3 | Status: COMPLETED | COMMUNITY
Start: 2020-05-04 | End: 2020-10-11

## 2021-02-11 RX ORDER — NORETHINDRONE ACETATE AND ETHINYL ESTRADIOL AND FERROUS FUMARATE 1MG-20(21)
1-20 KIT ORAL
Qty: 28 | Refills: 5 | Status: COMPLETED | COMMUNITY
Start: 2019-10-30 | End: 2020-08-19

## 2021-02-11 RX ORDER — NAPROXEN 500 MG/1
500 TABLET ORAL
Qty: 30 | Refills: 1 | Status: COMPLETED | COMMUNITY
Start: 2021-01-12 | End: 2021-01-27

## 2021-02-11 RX ORDER — MONTELUKAST 10 MG/1
10 TABLET, FILM COATED ORAL
Qty: 90 | Refills: 0 | Status: COMPLETED | COMMUNITY
Start: 2016-08-31 | End: 2020-09-11

## 2021-02-11 RX ORDER — SUCRALFATE 1 G/10ML
1 SUSPENSION ORAL 4 TIMES DAILY
Qty: 540 | Refills: 4 | Status: COMPLETED | COMMUNITY
Start: 2020-12-04 | End: 2021-01-11

## 2021-02-11 RX ORDER — ERGOCALCIFEROL 1.25 MG/1
1.25 MG CAPSULE, LIQUID FILLED ORAL
Qty: 4 | Refills: 2 | Status: COMPLETED | COMMUNITY
Start: 2019-05-31 | End: 2020-01-28

## 2021-02-11 RX ORDER — OMEPRAZOLE 20 MG/1
20 CAPSULE, DELAYED RELEASE ORAL TWICE DAILY
Qty: 180 | Refills: 3 | Status: COMPLETED | COMMUNITY
End: 2021-02-09

## 2021-02-11 NOTE — ASSESSMENT
[FreeTextEntry1] : Chest pain persistent \par US did not give any information and patient continues to have pain daily.\par Lower chest upper abdominal pain for a few months\par Seen by Dr Lee US done. Has severe pain seen in [place] ER. US repeated. HIDA scan done subsequently which was negative. \par Dr Lee stopped the Omeprazole\par \par Chest CT was negative\par Try Famotidine \par \par Right Elbow tendonitis/ Right shoulder and Right arm pain\par Prednisone Rx given\par  avoid heavy lifting, repetitive movements

## 2021-02-11 NOTE — HISTORY OF PRESENT ILLNESS
[FreeTextEntry1] : follow up [de-identified] : Ms. THU FU is a 40 year old female presenting for a follow up\par CT was negative, aware\par Dr Lee stopped the Omeprazole that she was on for 2 years.\par She is concerned about reflux symptoms and not taking Omeprazole.\par Pain near the right elbow when she moves the arm a certain way. Also has right shoulder and arm pain. Naproxen helped a little but not resolved.\par (Lower chest upper abdominal pain for a few months\par Seen by Dr Lee US done. Has severe pain seen in [place] ER. US repeated. HIDA scan done subsequently which was negative. \par Was prescribed dicyclomine. It helped initially but now not working. It is a constant pain in the right lower chest and in the back).\par \par \par

## 2021-03-02 ENCOUNTER — TRANSCRIPTION ENCOUNTER (OUTPATIENT)
Age: 41
End: 2021-03-02

## 2021-03-02 ENCOUNTER — NON-APPOINTMENT (OUTPATIENT)
Age: 41
End: 2021-03-02

## 2021-03-02 DIAGNOSIS — Z87.09 PERSONAL HISTORY OF OTHER DISEASES OF THE RESPIRATORY SYSTEM: ICD-10-CM

## 2021-03-03 ENCOUNTER — TRANSCRIPTION ENCOUNTER (OUTPATIENT)
Age: 41
End: 2021-03-03

## 2021-03-10 ENCOUNTER — APPOINTMENT (OUTPATIENT)
Dept: GASTROENTEROLOGY | Facility: CLINIC | Age: 41
End: 2021-03-10
Payer: MEDICAID

## 2021-03-10 VITALS
DIASTOLIC BLOOD PRESSURE: 78 MMHG | BODY MASS INDEX: 19.7 KG/M2 | WEIGHT: 130 LBS | SYSTOLIC BLOOD PRESSURE: 126 MMHG | HEIGHT: 68 IN | HEART RATE: 61 BPM | TEMPERATURE: 98 F

## 2021-03-10 DIAGNOSIS — Z09 ENCOUNTER FOR FOLLOW-UP EXAMINATION AFTER COMPLETED TREATMENT FOR CONDITIONS OTHER THAN MALIGNANT NEOPLASM: ICD-10-CM

## 2021-03-10 DIAGNOSIS — R10.84 GENERALIZED ABDOMINAL PAIN: ICD-10-CM

## 2021-03-10 PROCEDURE — 99213 OFFICE O/P EST LOW 20 MIN: CPT

## 2021-03-10 PROCEDURE — 99072 ADDL SUPL MATRL&STAF TM PHE: CPT

## 2021-03-10 NOTE — PHYSICAL EXAM
[General Appearance - Alert] : alert [General Appearance - In No Acute Distress] : in no acute distress [Neck Appearance] : the appearance of the neck was normal [Neck Cervical Mass (___cm)] : no neck mass was observed [Jugular Venous Distention Increased] : there was no jugular-venous distention [Thyroid Diffuse Enlargement] : the thyroid was not enlarged [Thyroid Nodule] : there were no palpable thyroid nodules [Auscultation Breath Sounds / Voice Sounds] : lungs were clear to auscultation bilaterally [Heart Rate And Rhythm] : heart rate was normal and rhythm regular [Heart Sounds] : normal S1 and S2 [Heart Sounds Gallop] : no gallops [Murmurs] : no murmurs [Heart Sounds Pericardial Friction Rub] : no pericardial rub [Full Pulse] : the pedal pulses are present [Edema] : there was no peripheral edema [Bowel Sounds] : normal bowel sounds [Abdomen Soft] : soft [Abdomen Tenderness] : non-tender [] : no hepato-splenomegaly [Abdomen Mass (___ Cm)] : no abdominal mass palpated [Abnormal Walk] : normal gait [Nail Clubbing] : no clubbing  or cyanosis of the fingernails [Musculoskeletal - Swelling] : no joint swelling seen [Motor Tone] : muscle strength and tone were normal [FreeTextEntry1] : tender along right side inferior rib cage [Oriented To Time, Place, And Person] : oriented to person, place, and time [Impaired Insight] : insight and judgment were intact [Affect] : the affect was normal

## 2021-03-10 NOTE — ASSESSMENT
[FreeTextEntry1] : 39yo female with atypical chest pain and gerd\par \par Will restart famotidine 40bid\par if no help, will consdier alternative ppi vs carafate

## 2021-03-10 NOTE — HISTORY OF PRESENT ILLNESS
[de-identified] : 41yo female with gerd, atypical chest pain\par \par She developed increasing side effects with omeprazole - some increased dyspepsia which resolved when she stopped taking Omeprazole. She has been given famotidine in the past but unclear if helped as she went back to omeprazole quickly. She has developed recurrent of some reflux and her atypical chest discomfort which not radiates over to the right in addition to the left side. She has had negative sono and HIDA scan. Last egd 1 year ago reviewed

## 2021-03-12 ENCOUNTER — APPOINTMENT (OUTPATIENT)
Dept: FAMILY MEDICINE | Facility: CLINIC | Age: 41
End: 2021-03-12
Payer: MEDICAID

## 2021-03-12 VITALS
OXYGEN SATURATION: 99 % | WEIGHT: 130 LBS | BODY MASS INDEX: 19.7 KG/M2 | DIASTOLIC BLOOD PRESSURE: 72 MMHG | HEART RATE: 88 BPM | SYSTOLIC BLOOD PRESSURE: 110 MMHG | HEIGHT: 68 IN | TEMPERATURE: 97.9 F

## 2021-03-12 DIAGNOSIS — G89.29 OTHER CHEST PAIN: ICD-10-CM

## 2021-03-12 DIAGNOSIS — R10.11 RIGHT UPPER QUADRANT PAIN: ICD-10-CM

## 2021-03-12 DIAGNOSIS — R07.89 OTHER CHEST PAIN: ICD-10-CM

## 2021-03-12 DIAGNOSIS — R10.32 LEFT LOWER QUADRANT PAIN: ICD-10-CM

## 2021-03-12 PROCEDURE — 99072 ADDL SUPL MATRL&STAF TM PHE: CPT

## 2021-03-12 PROCEDURE — 99214 OFFICE O/P EST MOD 30 MIN: CPT

## 2021-03-12 RX ORDER — PREDNISONE 10 MG/1
10 TABLET ORAL
Qty: 20 | Refills: 0 | Status: DISCONTINUED | COMMUNITY
Start: 2021-02-11 | End: 2021-02-28

## 2021-03-12 NOTE — HISTORY OF PRESENT ILLNESS
[FreeTextEntry1] : follow up [de-identified] : Ms. THU FU is a 40 year old female presenting for a follow up\par Migrating Joint pain started with right shoulder, then had elbow pain. \par Abdominal pain, seen by GI had follow up this week. w/u is negative.\par He advised to continue Famotidine I had prescribed.\par HIDA was negative. She states she is scheduled for Endoscopy\par Chest pain more to the right radiating to the abdomen, and back on right part.\par Prednisone helped, naproxen did not. Pain came back a few days after prednisone stopped.\par Seen by Neurologist Dr Shoemaker. Seen by Rheumatologist Dr Warren after that. W/u was negative. Had oral ulcers, Bechet`s ruled out.\par She suggested if Raynaud`s is causing a lot of symptoms CCB. Did not go back to Neuro as advised by Rheum.\par Seen by GYN Dr Chelo Loyola she put her on Rx for Vit D and B12 supplements 2000 mg.\par She also did labs for " Hormones level"\par LH is high and FSH was normal. thinks she is close to menopause. Advised follow up in 2-3 months for repeat labs.\par \par She took the Duloxetine I prescribed for 2 weeks then stopped because of headache. She states it helped her anxiety a lot.\par \par CT was negative, aware\par Dr Lee stopped the Omeprazole that she was on for 2 years.\par \par (Lower chest upper abdominal pain for a few months\par Seen by Dr Lee US done. Has severe pain seen in [place] ER. US repeated. HIDA scan done subsequently which was negative. \par Was prescribed dicyclomine. It helped initially but now not working. It is a constant pain in the right lower chest and in the back).\par \par \par

## 2021-03-12 NOTE — ASSESSMENT
[FreeTextEntry1] : Chest pain persistent / Multiple joint pains\par Chest CT was negative\par \par Abdominal pain: US did not give any information and patient continues to have pain\par HIDA negative\par Seen by Dr Lee US done. Has severe pain seen in [place] ER. US repeated. HIDA scan done subsequently which was negative. \par Dr Lee stopped the Omeprazole\par Continue Famotidine \par Has follow up with GI Dr Lee/ Endoscopy.\par \par Multiple joint complaints\par Restart Duloxetine, it helped. Also helped anxiety\par Stopped because of mild headaches.\par Prednisone helped, naproxen did not. Pain came back a few weeks after prednisone stopped.\par Seen by Neurologist Dr Shoemaker. Seen by Rheumatologist Dr Warren after that. W/u was negative. Had oral ulcers, Bechet`s ruled out.\par She suggested if Raynaud`s is causing a lot of symptoms CCB. Did not go back to Neuro as advised by Rheum.\par Seen Dr Shoemaker for follow up\par

## 2021-03-26 ENCOUNTER — APPOINTMENT (OUTPATIENT)
Dept: NEUROLOGY | Facility: CLINIC | Age: 41
End: 2021-03-26
Payer: MEDICAID

## 2021-03-26 DIAGNOSIS — M25.529 PAIN IN UNSPECIFIED ELBOW: ICD-10-CM

## 2021-03-26 DIAGNOSIS — G56.01 CARPAL TUNNEL SYNDROME, RIGHT UPPER LIMB: ICD-10-CM

## 2021-03-26 DIAGNOSIS — R52 PAIN, UNSPECIFIED: ICD-10-CM

## 2021-03-26 PROCEDURE — 99214 OFFICE O/P EST MOD 30 MIN: CPT | Mod: 95

## 2021-03-26 NOTE — REVIEW OF SYSTEMS
[Numbness] : no numbness [Tingling] : no tingling [Dizziness] : no dizziness [Lightheadedness] : no lightheadedness [Loss Of Hearing] : no hearing loss [Heart Rate Is Fast] : the heart rate was not fast [Heartburn] : heartburn [Arthralgias] : arthralgias [Joint Pain] : joint pain [Negative] : Heme/Lymph [de-identified] : Cervical and Rt elbow pain

## 2021-03-26 NOTE — HISTORY OF PRESENT ILLNESS
[FreeTextEntry1] : Verbal consent obtained from patient for tele health visit.\par She is 40-year-old patient seen in the past for evaluation for dizziness and chronic headaches with tremors and bilateral paresthesias numbness right more than left upper extremity intermittently. Workup including MRI of the brain and right upper extremity EMG/NCV studies did not reveal any acute pathology except for cervical radiculopathy into those in 2019. At that time patient has no cervical pain and did not come back for followup evaluation.\par Now coming for evaluation on telemetry health visit for persistent right elbow pain with cervical pain and upper body chest wall pain radiating into the upper thoracic area. No injuries or accidents associated with the period no paresthesias numbness currently. No radiating pains or radicular symptoms at present. Denies of any weakness nor symptoms involving left side. No headaches or dizziness currently.\par Seen by you and recommended neurology evaluation\par

## 2021-03-26 NOTE — REASON FOR VISIT
[Home] : at home, [unfilled] , at the time of the visit. [Medical Office: ([facility])___] : at the medical office located in  [Verbal consent obtained from patient] : the patient, [unfilled] [Follow-Up: _____] : a [unfilled] follow-up visit [FreeTextEntry1] : Follow up for pt with Ch Rt UE pain and CErvical pain

## 2021-03-26 NOTE — DISCUSSION/SUMMARY
[FreeTextEntry1] : Patient seen today on tele health visit for complaints about cervical pain radiating in the right upper extremity with right elbow pain with upper body pain on right side.\par No focal paresthesias or numbness currently.\par Previously had EMG/NCV studies, EEG and MRI of the brain did not reveal any acute pathology chronic cervical radiculopathy reported on EMG.\par Recommend patient to have MRI of the cervical spine.\par Consider repeating EMG NCV studies of right upper extremity.\par Followup evaluation after the workup is completed.\par Patient education provided and discussed with the patient.\par Followup with your rheumatologist as needed.\par

## 2021-03-29 ENCOUNTER — RX RENEWAL (OUTPATIENT)
Age: 41
End: 2021-03-29

## 2021-04-08 ENCOUNTER — NON-APPOINTMENT (OUTPATIENT)
Age: 41
End: 2021-04-08

## 2021-04-13 ENCOUNTER — TRANSCRIPTION ENCOUNTER (OUTPATIENT)
Age: 41
End: 2021-04-13

## 2021-05-03 ENCOUNTER — TRANSCRIPTION ENCOUNTER (OUTPATIENT)
Age: 41
End: 2021-05-03

## 2021-05-05 ENCOUNTER — APPOINTMENT (OUTPATIENT)
Dept: NEUROLOGY | Facility: CLINIC | Age: 41
End: 2021-05-05

## 2021-05-13 DIAGNOSIS — H92.09 OTALGIA, UNSPECIFIED EAR: ICD-10-CM

## 2021-05-25 ENCOUNTER — APPOINTMENT (OUTPATIENT)
Dept: OTOLARYNGOLOGY | Facility: CLINIC | Age: 41
End: 2021-05-25
Payer: MEDICAID

## 2021-05-25 VITALS
HEART RATE: 88 BPM | SYSTOLIC BLOOD PRESSURE: 110 MMHG | HEIGHT: 68 IN | BODY MASS INDEX: 19.7 KG/M2 | DIASTOLIC BLOOD PRESSURE: 74 MMHG | WEIGHT: 130 LBS | TEMPERATURE: 97.3 F

## 2021-05-25 DIAGNOSIS — J33.9 NASAL POLYP, UNSPECIFIED: ICD-10-CM

## 2021-05-25 DIAGNOSIS — H60.543 ACUTE ECZEMATOID OTITIS EXTERNA, BILATERAL: ICD-10-CM

## 2021-05-25 DIAGNOSIS — J30.9 ALLERGIC RHINITIS, UNSPECIFIED: ICD-10-CM

## 2021-05-25 DIAGNOSIS — J32.9 CHRONIC SINUSITIS, UNSPECIFIED: ICD-10-CM

## 2021-05-25 DIAGNOSIS — K21.9 GASTRO-ESOPHAGEAL REFLUX DISEASE W/OUT ESOPHAGITIS: ICD-10-CM

## 2021-05-25 DIAGNOSIS — L29.9 PRURITUS, UNSPECIFIED: ICD-10-CM

## 2021-05-25 DIAGNOSIS — H90.3 SENSORINEURAL HEARING LOSS, BILATERAL: ICD-10-CM

## 2021-05-25 DIAGNOSIS — J32.0 CHRONIC MAXILLARY SINUSITIS: ICD-10-CM

## 2021-05-25 PROCEDURE — 92567 TYMPANOMETRY: CPT

## 2021-05-25 PROCEDURE — 92557 COMPREHENSIVE HEARING TEST: CPT

## 2021-05-25 PROCEDURE — 99204 OFFICE O/P NEW MOD 45 MIN: CPT | Mod: 25

## 2021-05-25 PROCEDURE — 31231 NASAL ENDOSCOPY DX: CPT

## 2021-05-25 NOTE — ASSESSMENT
[FreeTextEntry1] : Patient with hx of itchy ears.  Hx of nasal polyps and prior nasal surgery x2 - last several years ago.  Also concerned about hearing. Patient with eczema of eac bilat - also has several small left nasal polyps that are asymptomatic.  No evidence of sinusitis today.  Feel problem related to allergies and recommended allergy eval.  Conservative care for polyps at this time unless they become symptomatic..  Started on flucinolone ointment for ear itch.  Follow up in 2 mos and as necessary.\par Also has findings of LPR and recommended treatment for reflux as per GI MD.

## 2021-05-25 NOTE — PHYSICAL EXAM
[Midline] : trachea located in midline position [Normal] : no rashes [Nasal Endoscopy Performed] : nasal endoscopy was performed, see procedure section for findings [] : septum deviated to the left [de-identified] : dry eac bilat with minimal inflammation

## 2021-05-25 NOTE — REVIEW OF SYSTEMS
[Seasonal Allergies] : seasonal allergies [Post Nasal Drip] : post nasal drip [Ear Pain] : ear pain [Ear Itch] : ear itch [Hoarseness] : hoarseness [Throat Clearing] : throat clearing [Throat Dryness] : throat dryness [Throat Itching] : throat itching [Eye Pain] : eye pain [Eyes Itch] : itching of the eyes [Shortness Of Breath] : shortness of breath [Wheezing] : wheezing [Heartburn] : heartburn [Joint Pain] : joint pain [Itching] : itching [Muscle Weakness] : muscle weakness [Easy Bruising] : a tendency for easy bruising [Negative] : Psychiatric [FreeTextEntry7] : difficulty swallowing  [de-identified] : sweating at night

## 2021-05-25 NOTE — HISTORY OF PRESENT ILLNESS
[de-identified] : c/o ear itch - problem bilateral . ? hearing loss in left ear several years ago.  No chronic ear problems.  Did have surgery x 2 for nasal polyps.  Also has hx of Thornton's in past but ? resolved.  Now off omeprazole but does take gaviscon.  Had last nasal surgery 2017 - occ has been treated with steroids by primary care.  Surgery was done by Dr Wadsworth.

## 2021-07-13 ENCOUNTER — APPOINTMENT (OUTPATIENT)
Dept: PEDIATRIC ALLERGY IMMUNOLOGY | Facility: CLINIC | Age: 41
End: 2021-07-13

## 2021-07-26 ENCOUNTER — APPOINTMENT (OUTPATIENT)
Dept: OTOLARYNGOLOGY | Facility: CLINIC | Age: 41
End: 2021-07-26

## 2021-08-05 ENCOUNTER — NON-APPOINTMENT (OUTPATIENT)
Age: 41
End: 2021-08-05

## 2021-08-05 ENCOUNTER — APPOINTMENT (OUTPATIENT)
Dept: FAMILY MEDICINE | Facility: CLINIC | Age: 41
End: 2021-08-05
Payer: MEDICAID

## 2021-08-05 VITALS
OXYGEN SATURATION: 99 % | HEIGHT: 68 IN | BODY MASS INDEX: 19.7 KG/M2 | HEART RATE: 80 BPM | RESPIRATION RATE: 16 BRPM | SYSTOLIC BLOOD PRESSURE: 142 MMHG | TEMPERATURE: 97.9 F | WEIGHT: 130 LBS | DIASTOLIC BLOOD PRESSURE: 88 MMHG

## 2021-08-05 DIAGNOSIS — Z13.39 ENCOUNTER FOR SCREENING EXAM FOR OTHER MENTAL HEALTH AND BEHAVIORAL DISORDERS: ICD-10-CM

## 2021-08-05 DIAGNOSIS — R22.0 LOCALIZED SWELLING, MASS AND LUMP, HEAD: ICD-10-CM

## 2021-08-05 DIAGNOSIS — G62.9 POLYNEUROPATHY, UNSPECIFIED: ICD-10-CM

## 2021-08-05 DIAGNOSIS — Z13.1 ENCOUNTER FOR SCREENING FOR DIABETES MELLITUS: ICD-10-CM

## 2021-08-05 DIAGNOSIS — L72.9 FOLLICULAR CYST OF THE SKIN AND SUBCUTANEOUS TISSUE, UNSPECIFIED: ICD-10-CM

## 2021-08-05 LAB
ESTIMATED AVERAGE GLUCOSE: 114 MG/DL
HBA1C MFR BLD HPLC: 5.6 %

## 2021-08-05 PROCEDURE — 99213 OFFICE O/P EST LOW 20 MIN: CPT | Mod: 25

## 2021-08-05 PROCEDURE — G0442 ANNUAL ALCOHOL SCREEN 15 MIN: CPT

## 2021-08-05 PROCEDURE — 99396 PREV VISIT EST AGE 40-64: CPT | Mod: 25

## 2021-08-05 PROCEDURE — 93000 ELECTROCARDIOGRAM COMPLETE: CPT | Mod: 59

## 2021-08-05 PROCEDURE — G0444 DEPRESSION SCREEN ANNUAL: CPT | Mod: 59

## 2021-08-05 RX ORDER — DICYCLOMINE HYDROCHLORIDE 20 MG/1
20 TABLET ORAL 3 TIMES DAILY
Qty: 90 | Refills: 2 | Status: COMPLETED | COMMUNITY
Start: 2020-12-29 | End: 2021-01-31

## 2021-08-05 RX ORDER — DULOXETINE HYDROCHLORIDE 30 MG/1
30 CAPSULE, DELAYED RELEASE PELLETS ORAL
Qty: 90 | Refills: 1 | Status: COMPLETED | COMMUNITY
Start: 2020-04-27 | End: 2020-05-20

## 2021-08-05 RX ORDER — FLUTICASONE FUROATE AND VILANTEROL TRIFENATATE 100; 25 UG/1; UG/1
100-25 POWDER RESPIRATORY (INHALATION)
Refills: 0 | Status: COMPLETED | COMMUNITY
End: 2019-08-05

## 2021-08-05 RX ORDER — FLUOCINOLONE ACETONIDE 0.25 MG/G
0.03 OINTMENT TOPICAL TWICE DAILY
Qty: 1 | Refills: 0 | Status: COMPLETED | COMMUNITY
Start: 2021-05-25 | End: 2021-06-22

## 2021-08-05 NOTE — HISTORY OF PRESENT ILLNESS
[FreeTextEntry1] : Annual [de-identified] : Ms. THU FU is a 40 year old female presenting for an annual\par GYN diagnosed her with PCOS\par Dr Loyola [facility]\par Skin lesion in scalp above her left year concerned because it is pigmented. Wants to see Dermatologist\par Migrating Joint pain started with right shoulder, then had elbow pain. \par Abdominal pain, seen by GI had follow up this week. w/u is negative.\par He advised to continue Famotidine I had prescribed.\par HIDA was negative. She states she is scheduled for Endoscopy\par Chest pain more to the right radiating to the abdomen, and back on right part.\par Prednisone helped, naproxen did not. Pain came back a few days after prednisone stopped.\par Seen by Neurologist Dr Shoemaker. Seen by Rheumatologist Dr Warren after that. W/u was negative. Had oral ulcers, Bechet`s ruled out.\par She suggested if Raynaud`s is causing a lot of symptoms CCB. Did not go back to Neuro as advised by Rheum.\par Seen by GYN Dr Chelo Loyola she put her on Rx for Vit D and B12 supplements 2000 mg.\par She also did labs for " Hormones level"\par LH is high and FSH was normal. thinks she is close to menopause. Advised follow up in 2-3 months for repeat labs.\par \par She took the Duloxetine I prescribed for 2 weeks then stopped because of headache. She states it helped her anxiety a lot.\par \par CT was negative, aware\par Dr Lee stopped the Omeprazole that she was on for 2 years.\par \par (Lower chest upper abdominal pain for a few months\par Seen by Dr Lee US done. Has severe pain seen in [place] ER. US repeated. HIDA scan done subsequently which was negative. \par Was prescribed dicyclomine. It helped initially but now not working. It is a constant pain in the right lower chest and in the back).\par \par \par

## 2021-08-05 NOTE — PHYSICAL EXAM
[Normal Sclera/Conjunctiva] : normal sclera/conjunctiva [No Edema] : there was no peripheral edema [Normal] : affect was normal and insight and judgment were intact [de-identified] : small nodular/ cystic lesion pigmented

## 2021-08-05 NOTE — ASSESSMENT
[FreeTextEntry1] : Annual\par SBIRT negative\par Depression screen negative\par Check comprehensive labs, in office today\par Vaccines declines.\par EKG NSR non sp changes\par GYN utd\par  pap up to date \par Mammogram declined, will reconsider has GYN appointment coming up\par \par Anxiety/ Multiple Joint complaints\par Will try Duloxetine again\par Restart Duloxetine, it helped. Also helped anxiety\par Stopped because of mild headaches.\par Will start with low dose and titrate\par Follow up in 6 weeks\par \par Scalp Lump/ ? Cystic lesion\par Dermatologist refferal given\par \par Previous Hx: Chest pain persistent / Multiple joint pains\par Chest CT was negative\par \par Abdominal pain Improved: US did not give any information and patient continues to have pain\par HIDA negative\par Seen by Dr Lee US done. Has severe pain seen in [place] ER. US repeated. HIDA scan done subsequently which was negative. \par Dr Lee stopped the Omeprazole\par Continue Famotidine. Had endoscopy\par Has follow up with GI Dr Lee\par \par Multiple joint complaints\par Seen by 2 Rheumatologist including Dr Warren\par Unlikely autoimmune w/u was negative.\par Raynauds was advised CCB did not want ot take.\par \par \par Cervical Radiculopathy\par EMG in chart no evidence of peripheral Neuropathy\par Seen by Rheumatologist Dr Warren after that. W/u was negative. Had oral ulcers, Bechet`s ruled out.\par She suggested if Raynaud`s is causing a lot of symptoms CCB. \par Seen by Dr Shoemaker for follow up 5/21\par

## 2021-08-05 NOTE — HEALTH RISK ASSESSMENT
[Fair] :  ~his/her~ mood as fair [No] : In the past 12 months have you used drugs other than those required for medical reasons? No [0] : 2) Feeling down, depressed, or hopeless: Not at all (0) [Patient declined mammogram] : Patient declined mammogram [Patient reported PAP Smear was normal] : Patient reported PAP Smear was normal [Behavioral] : behavioral [Financial] : financial [With Family] : lives with family [Unemployed] : unemployed [Significant Other] : lives with significant other [Sexually Active] : sexually active [Feels Safe at Home] : Feels safe at home [Smoke Detector] : smoke detector [Carbon Monoxide Detector] : carbon monoxide detector [Seat Belt] :  uses seat belt [Sunscreen] : uses sunscreen [With Patient/Caregiver] : , with patient/caregiver [Name: ___] : Health Care Proxy's Name: [unfilled]  [Relationship: ___] : Relationship: [unfilled] [Patient reported colonoscopy was normal] : Patient reported colonoscopy was normal [] : No [Audit-CScore] : 0 [[id number]] : 0 [High Risk Behavior] : no high risk behavior [Reports changes in hearing] : Reports no changes in hearing [Reports changes in vision] : Reports no changes in vision [Reports changes in dental health] : Reports no changes in dental health [PapSmearDate] : 8/2020 [ColonoscopyDate] : 1/2020 [HIVDate] : 5/2019 [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

## 2021-08-09 ENCOUNTER — TRANSCRIPTION ENCOUNTER (OUTPATIENT)
Age: 41
End: 2021-08-09

## 2021-08-09 LAB
ALBUMIN SERPL ELPH-MCNC: 4.6 G/DL
ALP BLD-CCNC: 69 U/L
ALT SERPL-CCNC: 10 U/L
ANION GAP SERPL CALC-SCNC: 15 MMOL/L
AST SERPL-CCNC: 13 U/L
BILIRUB SERPL-MCNC: 0.6 MG/DL
BUN SERPL-MCNC: 11 MG/DL
CALCIUM SERPL-MCNC: 9.8 MG/DL
CHLORIDE SERPL-SCNC: 104 MMOL/L
CHOLEST SERPL-MCNC: 189 MG/DL
CO2 SERPL-SCNC: 21 MMOL/L
CREAT SERPL-MCNC: 0.96 MG/DL
GLUCOSE SERPL-MCNC: 94 MG/DL
HDLC SERPL-MCNC: 45 MG/DL
LDLC SERPL CALC-MCNC: 121 MG/DL
NONHDLC SERPL-MCNC: 143 MG/DL
POTASSIUM SERPL-SCNC: 4.3 MMOL/L
PROT SERPL-MCNC: 6.9 G/DL
SODIUM SERPL-SCNC: 140 MMOL/L
TRIGL SERPL-MCNC: 114 MG/DL
URATE SERPL-MCNC: 3.4 MG/DL

## 2021-08-10 ENCOUNTER — TRANSCRIPTION ENCOUNTER (OUTPATIENT)
Age: 41
End: 2021-08-10

## 2021-09-20 ENCOUNTER — APPOINTMENT (OUTPATIENT)
Dept: FAMILY MEDICINE | Facility: CLINIC | Age: 41
End: 2021-09-20
Payer: MEDICAID

## 2021-09-20 VITALS
BODY MASS INDEX: 19.7 KG/M2 | HEART RATE: 80 BPM | TEMPERATURE: 98 F | OXYGEN SATURATION: 99 % | HEIGHT: 68 IN | DIASTOLIC BLOOD PRESSURE: 80 MMHG | RESPIRATION RATE: 16 BRPM | WEIGHT: 130 LBS | SYSTOLIC BLOOD PRESSURE: 118 MMHG

## 2021-09-20 DIAGNOSIS — K44.9 DIAPHRAGMATIC HERNIA W/OUT OBSTRUCTION OR GANGRENE: ICD-10-CM

## 2021-09-20 DIAGNOSIS — M94.0 CHONDROCOSTAL JUNCTION SYNDROME [TIETZE]: ICD-10-CM

## 2021-09-20 PROCEDURE — 99214 OFFICE O/P EST MOD 30 MIN: CPT

## 2021-09-20 NOTE — ASSESSMENT
[FreeTextEntry1] : GERD/ Hiatal Hernia\par Restart Famotidine bid\par 40 mg for 1 week then 20 mg bid\par \par Costochondritis: NSAIDs \par advised avoid repetitive movements, lifting heavy weights.\par

## 2021-09-20 NOTE — REVIEW OF SYSTEMS
[Fatigue] : fatigue [Insomnia] : insomnia [Anxiety] : anxiety [Negative] : Heme/Lymph [Joint Pain] : no joint pain [Muscle Pain] : no muscle pain [Suicidal] : not suicidal [FreeTextEntry7] : see hpi [de-identified] : see hpi

## 2021-09-20 NOTE — PHYSICAL EXAM
[Normal Sclera/Conjunctiva] : normal sclera/conjunctiva [No Edema] : there was no peripheral edema [Normal] : affect was normal and insight and judgment were intact [de-identified] : reproducible pain at site of pain left costochondral jn

## 2021-09-20 NOTE — HISTORY OF PRESENT ILLNESS
[FreeTextEntry8] : pain in the left upper abdomen and left mid chest. for a few weeks\hortencia Called GI Dr Lee was unable to make the appointment. Hx of Hiatal hernia\hortencia Has bitter taste. Omeprazole caused Muscle spasm hence stopped it.\par Tried famotidine, stopped it a while back. Stopped to see if her symptoms improved.

## 2021-11-11 ENCOUNTER — APPOINTMENT (OUTPATIENT)
Dept: GASTROENTEROLOGY | Facility: CLINIC | Age: 41
End: 2021-11-11
Payer: MEDICAID

## 2021-11-11 VITALS — SYSTOLIC BLOOD PRESSURE: 122 MMHG | HEART RATE: 80 BPM | DIASTOLIC BLOOD PRESSURE: 82 MMHG

## 2021-11-11 VITALS — BODY MASS INDEX: 19.7 KG/M2 | HEIGHT: 68 IN | WEIGHT: 130 LBS

## 2021-11-11 DIAGNOSIS — R10.13 EPIGASTRIC PAIN: ICD-10-CM

## 2021-11-11 PROCEDURE — 99214 OFFICE O/P EST MOD 30 MIN: CPT

## 2021-11-11 RX ORDER — FEXOFENADINE HCL 60 MG/1
60 TABLET, FILM COATED ORAL
Refills: 0 | Status: ACTIVE | COMMUNITY

## 2021-11-15 PROBLEM — R10.13 EPIGASTRIC PAIN: Status: ACTIVE | Noted: 2021-11-15

## 2021-11-15 NOTE — HISTORY OF PRESENT ILLNESS
[de-identified] : 40yo female with epigastric pain\par \par She has had epigastric pain on and off for months\par Omeprazole seemed to worsen her pain; she tolerated famotidine but didn’t help much \par She has allergies and her MD told her to consider eosinophilic esophagitis

## 2021-11-15 NOTE — ASSESSMENT
[FreeTextEntry1] : 42yo female with epigastric pain, some gerd\par No significant dysphagia\par \par ?EoE per allergy though no clear symptoms\par will start pantoprazole - monitor for increased dyspepsia seen with prior omeprazole use\par consider egd

## 2021-12-02 ENCOUNTER — APPOINTMENT (OUTPATIENT)
Dept: FAMILY MEDICINE | Facility: CLINIC | Age: 41
End: 2021-12-02

## 2022-01-29 ENCOUNTER — TRANSCRIPTION ENCOUNTER (OUTPATIENT)
Age: 42
End: 2022-01-29

## 2022-01-31 ENCOUNTER — TRANSCRIPTION ENCOUNTER (OUTPATIENT)
Age: 42
End: 2022-01-31

## 2022-03-03 DIAGNOSIS — B35.1 TINEA UNGUIUM: ICD-10-CM

## 2022-03-30 ENCOUNTER — APPOINTMENT (OUTPATIENT)
Dept: FAMILY MEDICINE | Facility: CLINIC | Age: 42
End: 2022-03-30
Payer: MEDICAID

## 2022-03-30 ENCOUNTER — NON-APPOINTMENT (OUTPATIENT)
Age: 42
End: 2022-03-30

## 2022-03-30 DIAGNOSIS — B34.9 VIRAL INFECTION, UNSPECIFIED: ICD-10-CM

## 2022-03-30 PROCEDURE — 99213 OFFICE O/P EST LOW 20 MIN: CPT | Mod: 95

## 2022-03-30 RX ORDER — DULOXETINE HYDROCHLORIDE 30 MG/1
30 CAPSULE, DELAYED RELEASE PELLETS ORAL
Qty: 90 | Refills: 0 | Status: DISCONTINUED | COMMUNITY
Start: 2021-08-05 | End: 2021-12-30

## 2022-03-30 NOTE — REVIEW OF SYSTEMS
[Fever] : fever [Fatigue] : fatigue [Cough] : cough [Insomnia] : insomnia [Anxiety] : anxiety [Negative] : Heme/Lymph [Shortness Of Breath] : no shortness of breath [Wheezing] : no wheezing [Dyspnea on Exertion] : no dyspnea on exertion [Joint Pain] : no joint pain [Muscle Pain] : no muscle pain [Suicidal] : not suicidal [FreeTextEntry4] : see hpi [FreeTextEntry6] : see hpi [de-identified] : see hpi

## 2022-03-30 NOTE — HISTORY OF PRESENT ILLNESS
[Mild] : mild [___ Days ago] : [unfilled] days ago [Constant] : constant [Congestion] : congestion [Cough] : cough [Chills] : chills [Fatigue] : fatigue [Worsening] : worsening [Home] : at home, [unfilled] , at the time of the visit. [Medical Office: ([facility])___] : at the medical office located in  [Verbal consent obtained from patient] : the patient, [unfilled] [Sore Throat] : no sore throat [Wheezing] : no wheezing [Anorexia] : no anorexia [Shortness Of Breath] : no shortness of breath [Earache] : no earache [Headache] : no headache [Fever] : no fever [FreeTextEntry8] : Taking Advil and Tylenol with Mucinex.\par Did not check temperature\par Has burning fever. Home test were negative\par fever has resolved.\par Nasal symptoms Fluticasone does not help\par Did not get flu vaccine\par  and son were sick  \par Concerned because she has nasal polyps\par

## 2022-03-30 NOTE — PHYSICAL EXAM
[Normal Sclera/Conjunctiva] : normal sclera/conjunctiva [No Respiratory Distress] : no respiratory distress  [No Rash] : no rash [Normal] : affect was normal and insight and judgment were intact

## 2022-03-30 NOTE — ASSESSMENT
[FreeTextEntry1] : Viral syndrome\par symptomatic treatment\par Home covid test negative\par Netti pot/ saline\par continue Advil/ Tylenol\par \par Nasal Polyps \par schedule to see ENT\par \par Follow up if not improved in 1 week or sooner if worse.

## 2022-06-02 ENCOUNTER — NON-APPOINTMENT (OUTPATIENT)
Age: 42
End: 2022-06-02

## 2022-06-07 ENCOUNTER — NON-APPOINTMENT (OUTPATIENT)
Age: 42
End: 2022-06-07

## 2022-09-09 ENCOUNTER — APPOINTMENT (OUTPATIENT)
Dept: GASTROENTEROLOGY | Facility: CLINIC | Age: 42
End: 2022-09-09

## 2022-09-09 VITALS
DIASTOLIC BLOOD PRESSURE: 71 MMHG | HEIGHT: 68 IN | BODY MASS INDEX: 19.1 KG/M2 | SYSTOLIC BLOOD PRESSURE: 119 MMHG | WEIGHT: 126 LBS | HEART RATE: 63 BPM

## 2022-09-09 DIAGNOSIS — K21.9 GASTRO-ESOPHAGEAL REFLUX DISEASE W/OUT ESOPHAGITIS: ICD-10-CM

## 2022-09-09 DIAGNOSIS — K22.70 BARRETT'S ESOPHAGUS W/OUT DYSPLASIA: ICD-10-CM

## 2022-09-09 DIAGNOSIS — R49.0 DYSPHONIA: ICD-10-CM

## 2022-09-09 PROCEDURE — 99213 OFFICE O/P EST LOW 20 MIN: CPT

## 2022-09-09 NOTE — HISTORY OF PRESENT ILLNESS
[de-identified] : Shobha Willis is a 42 year old female presenting today for follow up visit. In the past, has struggled with reflux and possible Donald's esophagus. Recently states she is feeling well except she notes that she has voice hoarseness almost daily, and reports intermittent dysphagia with some solids and pills. Denies any esophageal burning or epigastric pressure that she experienced previously. Was on PPI in the past but repeatedly had side effect of abdominal discomfort associated. Has not tried any medication recently to help throat hoarseness or dysphagia.

## 2022-09-09 NOTE — ASSESSMENT
[FreeTextEntry1] : Plan:\par Pt has questionable history of Donald's although no indication on previous endoscopy in 2020 as well as new onset dysphagia and hoarsness, will repeat endoscopy to be sure. Since pt has hx of side effects to PPI use, will also recommend daily famotidine for symptoms management since throat hoarseness may be caused by GERD. Reeducated pt on importance of dietary and lifestyle modifications. Risks versus benefits as well as instructions reviewed, pt agrees to planned procedure. All questions answered, discussed with Dr. Ryan. I have spent 25 minutes on this encounter.

## 2022-09-09 NOTE — REVIEW OF SYSTEMS
[Sore Throat] : sore throat [Hoarseness] : hoarseness [Negative] : Heme/Lymph [Earache] : no earache [Loss Of Hearing] : no hearing loss [Nosebleeds] : no nosebleeds [Nasal Discharge] : no nasal discharge

## 2022-09-09 NOTE — PHYSICAL EXAM
[General Appearance - Alert] : alert [General Appearance - In No Acute Distress] : in no acute distress [Sclera] : the sclera and conjunctiva were normal [Outer Ear] : the ears and nose were normal in appearance [Neck Appearance] : the appearance of the neck was normal [] : no respiratory distress [Respiration, Rhythm And Depth] : normal respiratory rhythm and effort [Heart Rate And Rhythm] : heart rate was normal and rhythm regular [Bowel Sounds] : normal bowel sounds [Abdomen Soft] : soft [Abdomen Tenderness] : non-tender [Abnormal Walk] : normal gait [Skin Color & Pigmentation] : normal skin color and pigmentation [Oriented To Time, Place, And Person] : oriented to person, place, and time

## 2022-09-15 ENCOUNTER — APPOINTMENT (OUTPATIENT)
Dept: FAMILY MEDICINE | Facility: CLINIC | Age: 42
End: 2022-09-15

## 2022-09-27 ENCOUNTER — APPOINTMENT (OUTPATIENT)
Dept: FAMILY MEDICINE | Facility: CLINIC | Age: 42
End: 2022-09-27

## 2022-09-27 ENCOUNTER — NON-APPOINTMENT (OUTPATIENT)
Age: 42
End: 2022-09-27

## 2022-09-27 VITALS
RESPIRATION RATE: 14 BRPM | DIASTOLIC BLOOD PRESSURE: 72 MMHG | HEART RATE: 62 BPM | TEMPERATURE: 98.3 F | OXYGEN SATURATION: 99 % | BODY MASS INDEX: 19.25 KG/M2 | SYSTOLIC BLOOD PRESSURE: 114 MMHG | HEIGHT: 68 IN | WEIGHT: 127 LBS

## 2022-09-27 DIAGNOSIS — Z13.31 ENCOUNTER FOR SCREENING FOR DEPRESSION: ICD-10-CM

## 2022-09-27 DIAGNOSIS — M54.12 RADICULOPATHY, CERVICAL REGION: ICD-10-CM

## 2022-09-27 DIAGNOSIS — F41.8 OTHER SPECIFIED ANXIETY DISORDERS: ICD-10-CM

## 2022-09-27 DIAGNOSIS — Z13.220 ENCOUNTER FOR SCREENING FOR LIPOID DISORDERS: ICD-10-CM

## 2022-09-27 DIAGNOSIS — Z13.29 ENCOUNTER FOR SCREENING FOR OTHER SUSPECTED ENDOCRINE DISORDER: ICD-10-CM

## 2022-09-27 DIAGNOSIS — E28.2 POLYCYSTIC OVARIAN SYNDROME: ICD-10-CM

## 2022-09-27 PROCEDURE — 36415 COLL VENOUS BLD VENIPUNCTURE: CPT

## 2022-09-27 PROCEDURE — 99396 PREV VISIT EST AGE 40-64: CPT | Mod: 25

## 2022-09-27 PROCEDURE — 99213 OFFICE O/P EST LOW 20 MIN: CPT | Mod: 25

## 2022-09-27 PROCEDURE — G0444 DEPRESSION SCREEN ANNUAL: CPT | Mod: 59

## 2022-09-27 PROCEDURE — 93000 ELECTROCARDIOGRAM COMPLETE: CPT | Mod: 59

## 2022-09-27 RX ORDER — PANTOPRAZOLE 40 MG/1
40 TABLET, DELAYED RELEASE ORAL DAILY
Qty: 30 | Refills: 5 | Status: COMPLETED | COMMUNITY
Start: 2021-11-11 | End: 2022-09-27

## 2022-09-27 RX ORDER — FAMOTIDINE 20 MG/1
20 TABLET, FILM COATED ORAL
Qty: 60 | Refills: 3 | Status: COMPLETED | COMMUNITY
Start: 2021-02-11 | End: 2022-09-27

## 2022-09-27 NOTE — HISTORY OF PRESENT ILLNESS
[FreeTextEntry1] : Annual [de-identified] : Ms. THU FU is a 40 year old female presenting for an annual\par States she is smyth and will discuss with GYN. She stopped the birth control thinking it was the problem. She went back on it after a few months because she was told she has PCOS.\par Dr Loyola [facility]\par \par \par PREV HX: Skin lesion in scalp above her left year concerned because it is pigmented. Wants to see Dermatologist\par Migrating Joint pain started with right shoulder, then had elbow pain. \par Abdominal pain, seen by GI had follow up this week. w/u is negative.\par He advised to continue Famotidine I had prescribed.\par HIDA was negative. She states she is scheduled for Endoscopy\par Chest pain more to the right radiating to the abdomen, and back on right part.\par Prednisone helped, naproxen did not. Pain came back a few days after prednisone stopped.\par Seen by Neurologist Dr Shoemaker. Seen by Rheumatologist Dr Warern after that. W/u was negative. Had oral ulcers, Bechet`s ruled out.\par She suggested if Raynaud`s is causing a lot of symptoms CCB. Did not go back to Neuro as advised by Rheum.\par Seen by GYN Dr Chelo Loyola she put her on Rx for Vit D and B12 supplements 2000 mg.\par She also did labs for " Hormones level"\par LH is high and FSH was normal. thinks she is close to menopause. Advised follow up in 2-3 months for repeat labs.\par \par She took the Duloxetine I prescribed for 2 weeks then stopped because of headache. She states it helped her anxiety a lot.\par \par CT was negative, aware\par Dr Lee stopped the Omeprazole that she was on for 2 years.\par \par (Lower chest upper abdominal pain for a few months\par Seen by Dr Lee US done. Has severe pain seen in [place] ER. US repeated. HIDA scan done subsequently which was negative. \par Was prescribed dicyclomine. It helped initially but now not working. It is a constant pain in the right lower chest and in the back).\par \par \par

## 2022-09-27 NOTE — REVIEW OF SYSTEMS
[Fatigue] : fatigue [Joint Pain] : joint pain [Muscle Pain] : muscle pain [Insomnia] : insomnia [Anxiety] : anxiety [Negative] : Heme/Lymph [Suicidal] : not suicidal [FreeTextEntry7] : see hpi [de-identified] : see hpi

## 2022-09-27 NOTE — ASSESSMENT
[FreeTextEntry1] : Annual\par SBIRT negative\par Depression screen negative\par Check comprehensive labs, in office today\par Vaccines declines.\par EKG NSR non sp changes\par GYN utd\par  pap up to date \par Mammogram 10/2021\par \par Anxiety\par Did not like Duloxetine\par Gave her headaches.\par Will try Zoloft \par \par Previous Hx: Chest pain persistent / Multiple joint pains\par Chest CT was negative\par \par Abdominal pain Improved: US did not give any information and patient continues to have pain\par HIDA negative\par Seen by Dr Lee US done. Has severe pain seen in [place] ER. US repeated. HIDA scan done subsequently which was negative. \par Dr Lee stopped the Omeprazole\par Continue Famotidine. Had endoscopy\par Has follow up with GI Dr Lee\par \par Multiple joint complaints\par Seen by 2 Rheumatologist including Dr Warren\par Unlikely autoimmune w/u was negative.\par Raynauds was advised CCB did not want or take.\par She suggested if Raynaud`s is causing a lot of symptoms \par Seen by Dr Shoemaker\par \par Cervical Radiculopathy\par EMG in chart no evidence of peripheral Neuropathy\par Seen by Rheumatologist Dr Warren after that. W/u was negative. Had oral ulcers, Bechet`s ruled out.\par \par Anxiety\par Discussed medication\par Will try Venlafaxine, discussed side effects\par Follow up in 6 weeks.\par \par \par

## 2022-09-27 NOTE — PHYSICAL EXAM
[Normal Sclera/Conjunctiva] : normal sclera/conjunctiva [No Edema] : there was no peripheral edema [Normal Posterior Cervical Nodes] : no posterior cervical lymphadenopathy [Normal Anterior Cervical Nodes] : no anterior cervical lymphadenopathy [Normal] : affect was normal and insight and judgment were intact [de-identified] : small nodular/ cystic lesion pigmented

## 2022-09-27 NOTE — HEALTH RISK ASSESSMENT
[Fair] :  ~his/her~ mood as fair [Never] : Never [No] : In the past 12 months have you used drugs other than those required for medical reasons? No [0] : 2) Feeling down, depressed, or hopeless: Not at all (0) [PHQ-2 Negative - No further assessment needed] : PHQ-2 Negative - No further assessment needed [Patient reported mammogram was normal] : Patient reported mammogram was normal [Patient reported PAP Smear was normal] : Patient reported PAP Smear was normal [Patient reported colonoscopy was normal] : Patient reported colonoscopy was normal [Behavioral] : behavioral [Financial] : financial [With Family] : lives with family [Unemployed] : unemployed [Significant Other] : lives with significant other [Sexually Active] : sexually active [Feels Safe at Home] : Feels safe at home [Smoke Detector] : smoke detector [Carbon Monoxide Detector] : carbon monoxide detector [Seat Belt] :  uses seat belt [Sunscreen] : uses sunscreen [With Patient/Caregiver] : , with patient/caregiver [Name: ___] : Health Care Proxy's Name: [unfilled]  [Relationship: ___] : Relationship: [unfilled] [Audit-CScore] : 0 [[id number]] : 0 [High Risk Behavior] : no high risk behavior [Reports changes in hearing] : Reports no changes in hearing [Reports changes in vision] : Reports no changes in vision [Reports changes in dental health] : Reports no changes in dental health [MammogramDate] : 10/21 [PapSmearDate] : 8/2021 [ColonoscopyDate] : 1/2020 [HIVDate] : 5/2019 [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

## 2022-09-28 ENCOUNTER — NON-APPOINTMENT (OUTPATIENT)
Age: 42
End: 2022-09-28

## 2022-10-03 ENCOUNTER — RESULT REVIEW (OUTPATIENT)
Age: 42
End: 2022-10-03

## 2022-10-03 ENCOUNTER — TRANSCRIPTION ENCOUNTER (OUTPATIENT)
Age: 42
End: 2022-10-03

## 2022-10-03 ENCOUNTER — APPOINTMENT (OUTPATIENT)
Dept: GASTROENTEROLOGY | Facility: AMBULATORY MEDICAL SERVICES | Age: 42
End: 2022-10-03

## 2022-10-03 LAB
25(OH)D3 SERPL-MCNC: 26.7 NG/ML
ALBUMIN SERPL ELPH-MCNC: 4.5 G/DL
ALP BLD-CCNC: 58 U/L
ALT SERPL-CCNC: 14 U/L
ANION GAP SERPL CALC-SCNC: 13 MMOL/L
AST SERPL-CCNC: 14 U/L
BASOPHILS # BLD AUTO: 0.1 K/UL
BASOPHILS NFR BLD AUTO: 2.3 %
BILIRUB SERPL-MCNC: 0.4 MG/DL
BUN SERPL-MCNC: 14 MG/DL
CALCIUM SERPL-MCNC: 9.8 MG/DL
CHLORIDE SERPL-SCNC: 105 MMOL/L
CHOLEST SERPL-MCNC: 196 MG/DL
CO2 SERPL-SCNC: 23 MMOL/L
CREAT SERPL-MCNC: 1.01 MG/DL
EGFR: 71 ML/MIN/1.73M2
EOSINOPHIL # BLD AUTO: 0.63 K/UL
EOSINOPHIL NFR BLD AUTO: 14.3 %
GLUCOSE SERPL-MCNC: 97 MG/DL
HCT VFR BLD CALC: 46.9 %
HDLC SERPL-MCNC: 40 MG/DL
HGB BLD-MCNC: 15.2 G/DL
IMM GRANULOCYTES NFR BLD AUTO: 0.2 %
LDLC SERPL CALC-MCNC: 136 MG/DL
LYMPHOCYTES # BLD AUTO: 1.15 K/UL
LYMPHOCYTES NFR BLD AUTO: 26 %
MAN DIFF?: NORMAL
MCHC RBC-ENTMCNC: 31.5 PG
MCHC RBC-ENTMCNC: 32.4 GM/DL
MCV RBC AUTO: 97.1 FL
MONOCYTES # BLD AUTO: 0.34 K/UL
MONOCYTES NFR BLD AUTO: 7.7 %
NEUTROPHILS # BLD AUTO: 2.19 K/UL
NEUTROPHILS NFR BLD AUTO: 49.5 %
NONHDLC SERPL-MCNC: 156 MG/DL
PLATELET # BLD AUTO: 313 K/UL
POTASSIUM SERPL-SCNC: 4.5 MMOL/L
PROT SERPL-MCNC: 6.8 G/DL
RBC # BLD: 4.83 M/UL
RBC # FLD: 14.6 %
SODIUM SERPL-SCNC: 142 MMOL/L
TRIGL SERPL-MCNC: 101 MG/DL
TSH SERPL-ACNC: 2.08 UIU/ML
URATE SERPL-MCNC: 3.4 MG/DL
WBC # FLD AUTO: 4.42 K/UL

## 2022-10-03 PROCEDURE — 43239 EGD BIOPSY SINGLE/MULTIPLE: CPT | Mod: GC

## 2022-10-19 ENCOUNTER — TRANSCRIPTION ENCOUNTER (OUTPATIENT)
Age: 42
End: 2022-10-19

## 2022-11-07 ENCOUNTER — APPOINTMENT (OUTPATIENT)
Dept: FAMILY MEDICINE | Facility: CLINIC | Age: 42
End: 2022-11-07

## 2022-11-23 ENCOUNTER — NON-APPOINTMENT (OUTPATIENT)
Age: 42
End: 2022-11-23

## 2023-01-04 ENCOUNTER — RX RENEWAL (OUTPATIENT)
Age: 43
End: 2023-01-04

## 2023-01-04 RX ORDER — SUCRALFATE 1 G/1
1 TABLET ORAL 4 TIMES DAILY
Qty: 40 | Refills: 3 | Status: ACTIVE | COMMUNITY
Start: 2021-01-19 | End: 1900-01-01

## 2023-01-13 ENCOUNTER — APPOINTMENT (OUTPATIENT)
Dept: INTERNAL MEDICINE | Facility: CLINIC | Age: 43
End: 2023-01-13

## 2023-01-16 NOTE — REASON FOR VISIT
01/16/23    PCP SIGNATURE NEEDED FOR [facility] / PLAN OF CARE FORM RECEIVED VIA FAX AND PLACED IN PCP FOLDER TO BE DELIVERED AT ASSIGNED TIMES        Certification Period :  12/16/22 -- 02/13/23 [Consultation] : a consultation visit [FreeTextEntry1] : Evaluation for pt with Ch Dizziness with intermittent light bheadedness with episodes of vertiginous feeling while in bed, with numbness in Rt hand, tremors, joint pains

## 2023-01-31 ENCOUNTER — NON-APPOINTMENT (OUTPATIENT)
Age: 43
End: 2023-01-31

## 2023-02-14 ENCOUNTER — NON-APPOINTMENT (OUTPATIENT)
Age: 43
End: 2023-02-14

## 2023-05-30 ENCOUNTER — TRANSCRIPTION ENCOUNTER (OUTPATIENT)
Age: 43
End: 2023-05-30

## 2023-06-01 ENCOUNTER — APPOINTMENT (OUTPATIENT)
Dept: FAMILY MEDICINE | Facility: CLINIC | Age: 43
End: 2023-06-01

## 2023-09-08 ENCOUNTER — APPOINTMENT (OUTPATIENT)
Dept: RHEUMATOLOGY | Facility: CLINIC | Age: 43
End: 2023-09-08

## 2023-09-12 ENCOUNTER — APPOINTMENT (OUTPATIENT)
Dept: GASTROENTEROLOGY | Facility: CLINIC | Age: 43
End: 2023-09-12
Payer: MEDICAID

## 2023-09-12 VITALS
WEIGHT: 127 LBS | SYSTOLIC BLOOD PRESSURE: 110 MMHG | BODY MASS INDEX: 19.25 KG/M2 | HEIGHT: 68 IN | DIASTOLIC BLOOD PRESSURE: 70 MMHG

## 2023-09-12 DIAGNOSIS — Z00.00 ENCOUNTER FOR GENERAL ADULT MEDICAL EXAMINATION W/OUT ABNORMAL FINDINGS: ICD-10-CM

## 2023-09-12 DIAGNOSIS — R13.10 DYSPHAGIA, UNSPECIFIED: ICD-10-CM

## 2023-09-12 PROCEDURE — 99213 OFFICE O/P EST LOW 20 MIN: CPT

## 2023-09-25 ENCOUNTER — APPOINTMENT (OUTPATIENT)
Dept: RHEUMATOLOGY | Facility: CLINIC | Age: 43
End: 2023-09-25

## 2023-09-25 ENCOUNTER — APPOINTMENT (OUTPATIENT)
Dept: FAMILY MEDICINE | Facility: CLINIC | Age: 43
End: 2023-09-25

## 2023-10-14 ENCOUNTER — NON-APPOINTMENT (OUTPATIENT)
Age: 43
End: 2023-10-14

## 2023-12-11 ENCOUNTER — APPOINTMENT (OUTPATIENT)
Dept: GASTROENTEROLOGY | Facility: AMBULATORY MEDICAL SERVICES | Age: 43
End: 2023-12-11

## 2023-12-19 ENCOUNTER — APPOINTMENT (OUTPATIENT)
Dept: INTERNAL MEDICINE | Facility: CLINIC | Age: 43
End: 2023-12-19
Payer: MEDICAID

## 2023-12-19 ENCOUNTER — NON-APPOINTMENT (OUTPATIENT)
Age: 43
End: 2023-12-19

## 2023-12-19 VITALS
HEIGHT: 68 IN | TEMPERATURE: 98.4 F | HEART RATE: 72 BPM | BODY MASS INDEX: 19.4 KG/M2 | DIASTOLIC BLOOD PRESSURE: 70 MMHG | OXYGEN SATURATION: 99 % | RESPIRATION RATE: 14 BRPM | WEIGHT: 128 LBS | SYSTOLIC BLOOD PRESSURE: 120 MMHG

## 2023-12-19 DIAGNOSIS — K21.9 GASTRO-ESOPHAGEAL REFLUX DISEASE W/OUT ESOPHAGITIS: ICD-10-CM

## 2023-12-19 DIAGNOSIS — Z80.41 FAMILY HISTORY OF MALIGNANT NEOPLASM OF OVARY: ICD-10-CM

## 2023-12-19 DIAGNOSIS — E55.9 VITAMIN D DEFICIENCY, UNSPECIFIED: ICD-10-CM

## 2023-12-19 DIAGNOSIS — N93.9 ABNORMAL UTERINE AND VAGINAL BLEEDING, UNSPECIFIED: ICD-10-CM

## 2023-12-19 DIAGNOSIS — M79.602 PAIN IN LEFT ARM: ICD-10-CM

## 2023-12-19 DIAGNOSIS — R94.31 ABNORMAL ELECTROCARDIOGRAM [ECG] [EKG]: ICD-10-CM

## 2023-12-19 DIAGNOSIS — Z00.00 ENCOUNTER FOR GENERAL ADULT MEDICAL EXAMINATION W/OUT ABNORMAL FINDINGS: ICD-10-CM

## 2023-12-19 PROCEDURE — 99396 PREV VISIT EST AGE 40-64: CPT | Mod: 25

## 2023-12-19 PROCEDURE — 93000 ELECTROCARDIOGRAM COMPLETE: CPT

## 2023-12-19 RX ORDER — VENLAFAXINE HYDROCHLORIDE 37.5 MG/1
37.5 CAPSULE, EXTENDED RELEASE ORAL
Qty: 90 | Refills: 0 | Status: DISCONTINUED | COMMUNITY
Start: 2022-09-27 | End: 2023-12-19

## 2023-12-19 RX ORDER — NORETHINDRONE ACETATE AND ETHINYL ESTRADIOL AND FERROUS FUMARATE 1MG-20(21)
1-20 KIT ORAL
Refills: 0 | Status: DISCONTINUED | COMMUNITY
End: 2023-12-19

## 2023-12-19 NOTE — PAST MEDICAL HISTORY
[Perimenopausal] : perimenopausal [Definite ___ (Date)] : the last menstrual period was [unfilled] [Total Preg ___] : G[unfilled] [Living ___] : Living: [unfilled] [AB Spont ___] : miscarriages: [unfilled]

## 2023-12-19 NOTE — HEALTH RISK ASSESSMENT
[No] : In the past 12 months have you used drugs other than those required for medical reasons? No [0] : 2) Feeling down, depressed, or hopeless: Not at all (0) [PHQ-2 Negative - No further assessment needed] : PHQ-2 Negative - No further assessment needed [Patient reported mammogram was normal] : Patient reported mammogram was normal [Patient reported PAP Smear was normal] : Patient reported PAP Smear was normal [Patient reported colonoscopy was normal] : Patient reported colonoscopy was normal [HIV Test offered] : HIV Test offered [Hepatitis C test offered] : Hepatitis C test offered [With Family] : lives with family [Employed] : employed [] :  [Never] : Never [[id number]] : 0 [MammogramDate] : 03/2023 [PapSmearDate] : 10/2022 [ColonoscopyDate] : 01/2020 [ColonoscopyComments] : GI Dr Lee. 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

## 2023-12-19 NOTE — ASSESSMENT
[FreeTextEntry1] : Physical Exam: Constitutional: No acute distress, well appearing HEENT: Normocephalic, atraumatic Neck: supple Cardiac:  Regular rate and rhythm, No murmurs Pulmonary: No respiratory distress, Lungs clear to auscultation bilaterally, no wheezing, rales, or rhonchi Abdomen: Soft, non-tender, non-distended, no guarding, normal bowel sounds Vascular: No peripheral edema Neurology: Coordination grossly intact, no focal deficits Psychiatric: Alert and oriented x3, normal mood   A/P: HCM: - she will do fasting bloodwork at the lab - flu- declined - TDAP- she would like to schedule for separate day - Colon CA screening- UTD - Breast CA screening- UTD - cervical CA screening- UTD  abnormal ekg done as part of routine CPE pt asymptomatic however she often has reflux sx which we discussed can overlap with potential cardiac sx have reviewed her most recent cardiac evaluation from august with Dr De Souza  she is pending carotid duplex, exercise stress test, echo, ct calcium, holter monitor.  states waiting for insurance approval states she had negative CT calcium score (zero) about 3 years ago last ekg for comparison in chart is from 9/2022- ekg changes new since then she was given copy of EKG report today to bring to her cardiologist - rec she f/u with her cardiologist for further evaluation and tx as indicated. pt agreeable  left antecubital pain  no abnormalities noted on exam - will get sono to r/o clot  GERD - rec close follow up with GI - c/w PPI and or famotidine as directed by GI - avoid trigger foods - rec to have upcoming GI eval sent to us  hx vitamin d def currently on vitamin d 30408 weekly  - will check levels  vaginal bleeding states has resolved getting worked up with gyn  - f/u gyn - rec she have results sent over to us

## 2023-12-19 NOTE — HISTORY OF PRESENT ILLNESS
[FreeTextEntry1] : CPE [de-identified] : THU JO is a 43 year old female with PMHx vitamin d insuff, HLD, GERD presenting for CPE. States has been having left antecbital pain.   left antecubital pain for a few months. not everyday. random, no specific triggers. states arm movement nor physical exertion bring it on. nonradiation of pain.  it lasts for a few sec. states it feels sore such as after getting blood drawn. denies cp, sob. no swelling or numbness. no rashes. no weakness of arm. not itchy.   states she will be having her 3rd surgery for nasal polyps. most recent steroid use was 2 weeks ago -prednisone and then taper. states these polyps have come about from having reflux. states she has a hx of silent reflux. states she had oral surgery for enamel erosion due to the reflux as well. states has hx hiatal hernia as well as requires esophageal balloon dilatation once a year. states her last EGD from 10/2022 did not show barrets but her previous one did. states she is currently being worked up for possible eosinophilic esophagitis. GI Dr. Clifton.  takes famotidine only as needed. started getting spasms from omeprazole. states that she tries to avoid foods that worsen her reflux.   takes vitamin d 32511 weekly for the last 2 years. rx from her gyn.   takes fexofenadine for seasonal allergies.  states may have fibroid. stopped taking ocp in june. she was experiencing vaginal bleeding last month up until last friday. getting sonogram with possible biopsy with her gyn Dr Sabillon ([facility]) next week.

## 2023-12-27 ENCOUNTER — RX RENEWAL (OUTPATIENT)
Age: 43
End: 2023-12-27

## 2023-12-27 RX ORDER — FAMOTIDINE 40 MG/1
40 TABLET, FILM COATED ORAL
Qty: 30 | Refills: 5 | Status: ACTIVE | COMMUNITY
Start: 2022-09-09 | End: 1900-01-01

## 2023-12-29 ENCOUNTER — APPOINTMENT (OUTPATIENT)
Dept: ULTRASOUND IMAGING | Facility: CLINIC | Age: 43
End: 2023-12-29

## 2023-12-29 ENCOUNTER — OUTPATIENT (OUTPATIENT)
Dept: OUTPATIENT SERVICES | Facility: HOSPITAL | Age: 43
LOS: 1 days | End: 2023-12-29
Payer: MEDICAID

## 2023-12-29 DIAGNOSIS — M79.602 PAIN IN LEFT ARM: ICD-10-CM

## 2023-12-29 PROCEDURE — 93971 EXTREMITY STUDY: CPT | Mod: 26,LT

## 2024-02-24 ENCOUNTER — TRANSCRIPTION ENCOUNTER (OUTPATIENT)
Age: 44
End: 2024-02-24

## 2024-02-26 ENCOUNTER — NON-APPOINTMENT (OUTPATIENT)
Age: 44
End: 2024-02-26

## 2024-03-14 ENCOUNTER — NON-APPOINTMENT (OUTPATIENT)
Age: 44
End: 2024-03-14

## 2024-04-10 LAB
25(OH)D3 SERPL-MCNC: 19.3 NG/ML
ALBUMIN SERPL ELPH-MCNC: 4.3 G/DL
ALP BLD-CCNC: 68 U/L
ALT SERPL-CCNC: 9 U/L
ANION GAP SERPL CALC-SCNC: 11 MMOL/L
AST SERPL-CCNC: 14 U/L
BILIRUB SERPL-MCNC: 0.4 MG/DL
BUN SERPL-MCNC: 13 MG/DL
CALCIUM SERPL-MCNC: 9.2 MG/DL
CHLORIDE SERPL-SCNC: 107 MMOL/L
CHOLEST SERPL-MCNC: 170 MG/DL
CO2 SERPL-SCNC: 23 MMOL/L
CREAT SERPL-MCNC: 0.91 MG/DL
EGFR: 80 ML/MIN/1.73M2
ESTIMATED AVERAGE GLUCOSE: 111 MG/DL
FOLATE SERPL-MCNC: 17.7 NG/ML
GLUCOSE SERPL-MCNC: 95 MG/DL
HBA1C MFR BLD HPLC: 5.5 %
HCT VFR BLD CALC: 41.6 %
HDLC SERPL-MCNC: 46 MG/DL
HGB BLD-MCNC: 13.8 G/DL
LDLC SERPL CALC-MCNC: 107 MG/DL
MCHC RBC-ENTMCNC: 30.3 PG
MCHC RBC-ENTMCNC: 33.2 GM/DL
MCV RBC AUTO: 91.4 FL
NONHDLC SERPL-MCNC: 123 MG/DL
PLATELET # BLD AUTO: 258 K/UL
POTASSIUM SERPL-SCNC: 4 MMOL/L
PROT SERPL-MCNC: 6.6 G/DL
RBC # BLD: 4.55 M/UL
RBC # FLD: 14.5 %
SODIUM SERPL-SCNC: 142 MMOL/L
TRIGL SERPL-MCNC: 89 MG/DL
TSH SERPL-ACNC: 1.79 UIU/ML
VIT B12 SERPL-MCNC: 507 PG/ML
WBC # FLD AUTO: 4.66 K/UL

## 2024-04-11 RX ORDER — ERGOCALCIFEROL 1.25 MG/1
1.25 MG CAPSULE, LIQUID FILLED ORAL
Qty: 12 | Refills: 1 | Status: DISCONTINUED | COMMUNITY
Start: 2021-03-12 | End: 2024-04-11

## 2024-04-11 RX ORDER — MULTIVIT-MIN/FOLIC/VIT K/LYCOP 400-300MCG
50 MCG TABLET ORAL
Qty: 90 | Refills: 0 | Status: ACTIVE | COMMUNITY
Start: 2024-04-11 | End: 1900-01-01

## 2024-11-12 NOTE — END OF VISIT
Prior Authorization Not Needed per Insurance    Medication: CAPECITABINE PO  Insurance Company:    Expected CoPay: $ 0   Pharmacy Filling the Rx: [facility] - [place], [place] - [address]  Pharmacy Notified: Yes  Patient Notified: Yes   [Well Developed] : well developed [Well Nourished] : well nourished [No Acute Distress] : no acute distress [Normal Conjunctiva] : normal conjunctiva [Normal Venous Pressure] : normal venous pressure [5th Left ICS - MCL] : palpated at the 5th LICS in the midclavicular line [Normal] : normal [No Precordial Heave] : no precordial heave was noted [Normal Rate] : normal [Time Spent: ___ minutes] : I have spent [unfilled] minutes of time on the encounter. [Rhythm Regular] : regular [Normal S1] : normal S1 [Normal S2] : normal S2 [No Murmur] : no murmurs heard [No Pitting Edema] : no pitting edema present [2+] : left 2+ [Clear Lung Fields] : clear lung fields [Good Air Entry] : good air entry [No Respiratory Distress] : no respiratory distress  [Soft] : abdomen soft [Non Tender] : non-tender [Normal Gait] : normal gait [No Edema] : no edema [No Varicosities] : no varicosities [No Rash] : no rash [Moves all extremities] : moves all extremities [No Focal Deficits] : no focal deficits [Alert and Oriented] : alert and oriented [de-identified] : Right hand: MTP joints swollen with erythema and TTP.